# Patient Record
Sex: FEMALE | Race: WHITE | NOT HISPANIC OR LATINO
[De-identification: names, ages, dates, MRNs, and addresses within clinical notes are randomized per-mention and may not be internally consistent; named-entity substitution may affect disease eponyms.]

---

## 2018-05-04 ENCOUNTER — NON-APPOINTMENT (OUTPATIENT)
Age: 83
End: 2018-05-04

## 2018-05-04 ENCOUNTER — APPOINTMENT (OUTPATIENT)
Dept: CARDIOLOGY | Facility: CLINIC | Age: 83
End: 2018-05-04

## 2018-05-04 VITALS
HEIGHT: 61 IN | OXYGEN SATURATION: 99 % | RESPIRATION RATE: 11 BRPM | WEIGHT: 147 LBS | HEART RATE: 70 BPM | TEMPERATURE: 97.8 F | DIASTOLIC BLOOD PRESSURE: 85 MMHG | BODY MASS INDEX: 27.75 KG/M2 | SYSTOLIC BLOOD PRESSURE: 179 MMHG

## 2018-05-04 DIAGNOSIS — R94.6 ABNORMAL RESULTS OF THYROID FUNCTION STUDIES: ICD-10-CM

## 2018-05-04 DIAGNOSIS — Z86.19 PERSONAL HISTORY OF OTHER INFECTIOUS AND PARASITIC DISEASES: ICD-10-CM

## 2018-05-04 DIAGNOSIS — Z87.39 PERSONAL HISTORY OF OTHER DISEASES OF THE MUSCULOSKELETAL SYSTEM AND CONNECTIVE TISSUE: ICD-10-CM

## 2018-05-04 DIAGNOSIS — Z87.19 PERSONAL HISTORY OF OTHER DISEASES OF THE DIGESTIVE SYSTEM: ICD-10-CM

## 2018-05-04 DIAGNOSIS — Z87.440 PERSONAL HISTORY OF URINARY (TRACT) INFECTIONS: ICD-10-CM

## 2018-05-04 DIAGNOSIS — Z82.49 FAMILY HISTORY OF ISCHEMIC HEART DISEASE AND OTHER DISEASES OF THE CIRCULATORY SYSTEM: ICD-10-CM

## 2018-05-04 DIAGNOSIS — Z86.69 PERSONAL HISTORY OF OTHER DISEASES OF THE NERVOUS SYSTEM AND SENSE ORGANS: ICD-10-CM

## 2018-05-04 RX ORDER — MULTIVITAMIN
TABLET ORAL
Refills: 0 | Status: ACTIVE | COMMUNITY

## 2018-05-04 RX ORDER — MULTIVIT-MIN/FOLIC/VIT K/LYCOP 400-300MCG
25 MCG TABLET ORAL
Refills: 0 | Status: ACTIVE | COMMUNITY

## 2018-05-04 RX ORDER — ASPIRIN 81 MG
81 TABLET, DELAYED RELEASE (ENTERIC COATED) ORAL
Refills: 0 | Status: ACTIVE | COMMUNITY

## 2018-05-04 RX ORDER — CALCIUM CITRATE/VITAMIN D3 315MG-6.25
TABLET ORAL
Refills: 0 | Status: ACTIVE | COMMUNITY

## 2018-05-16 ENCOUNTER — MEDICATION RENEWAL (OUTPATIENT)
Age: 83
End: 2018-05-16

## 2018-05-24 ENCOUNTER — APPOINTMENT (OUTPATIENT)
Dept: CARDIOLOGY | Facility: CLINIC | Age: 83
End: 2018-05-24

## 2018-08-13 ENCOUNTER — NON-APPOINTMENT (OUTPATIENT)
Age: 83
End: 2018-08-13

## 2018-08-13 ENCOUNTER — APPOINTMENT (OUTPATIENT)
Dept: CARDIOLOGY | Facility: CLINIC | Age: 83
End: 2018-08-13

## 2018-08-13 VITALS
OXYGEN SATURATION: 98 % | HEIGHT: 61 IN | WEIGHT: 134 LBS | TEMPERATURE: 97.7 F | DIASTOLIC BLOOD PRESSURE: 93 MMHG | SYSTOLIC BLOOD PRESSURE: 199 MMHG | BODY MASS INDEX: 25.3 KG/M2 | HEART RATE: 90 BPM

## 2018-10-15 ENCOUNTER — RX RENEWAL (OUTPATIENT)
Age: 83
End: 2018-10-15

## 2018-11-29 ENCOUNTER — APPOINTMENT (OUTPATIENT)
Dept: CARDIOLOGY | Facility: CLINIC | Age: 83
End: 2018-11-29
Payer: MEDICARE

## 2018-11-29 ENCOUNTER — NON-APPOINTMENT (OUTPATIENT)
Age: 83
End: 2018-11-29

## 2018-11-29 ENCOUNTER — RECORD ABSTRACTING (OUTPATIENT)
Age: 83
End: 2018-11-29

## 2018-11-29 VITALS
WEIGHT: 131 LBS | DIASTOLIC BLOOD PRESSURE: 64 MMHG | HEART RATE: 84 BPM | OXYGEN SATURATION: 98 % | SYSTOLIC BLOOD PRESSURE: 142 MMHG | BODY MASS INDEX: 24.75 KG/M2

## 2018-11-29 DIAGNOSIS — Z78.9 OTHER SPECIFIED HEALTH STATUS: ICD-10-CM

## 2018-11-29 DIAGNOSIS — G96.19 OTHER DISORDERS OF MENINGES, NOT ELSEWHERE CLASSIFIED: ICD-10-CM

## 2018-11-29 PROCEDURE — 93000 ELECTROCARDIOGRAM COMPLETE: CPT

## 2018-11-29 PROCEDURE — 99214 OFFICE O/P EST MOD 30 MIN: CPT

## 2018-12-01 ENCOUNTER — RESULT REVIEW (OUTPATIENT)
Age: 83
End: 2018-12-01

## 2018-12-05 ENCOUNTER — APPOINTMENT (OUTPATIENT)
Dept: NEPHROLOGY | Facility: CLINIC | Age: 83
End: 2018-12-05
Payer: MEDICARE

## 2018-12-05 VITALS
SYSTOLIC BLOOD PRESSURE: 140 MMHG | BODY MASS INDEX: 23.55 KG/M2 | WEIGHT: 128 LBS | HEART RATE: 72 BPM | HEIGHT: 62 IN | DIASTOLIC BLOOD PRESSURE: 60 MMHG

## 2018-12-05 PROCEDURE — 99214 OFFICE O/P EST MOD 30 MIN: CPT

## 2018-12-10 ENCOUNTER — OTHER (OUTPATIENT)
Age: 83
End: 2018-12-10

## 2018-12-10 LAB
ANION GAP SERPL CALC-SCNC: 16 MMOL/L
APPEARANCE: ABNORMAL
BACTERIA UR CULT: ABNORMAL
BACTERIA: ABNORMAL
BILIRUBIN URINE: NEGATIVE
BLOOD URINE: NEGATIVE
BUN SERPL-MCNC: 18 MG/DL
CALCIUM SERPL-MCNC: 9.5 MG/DL
CHLORIDE SERPL-SCNC: 97 MMOL/L
CHOLEST SERPL-MCNC: 171 MG/DL
CHOLEST/HDLC SERPL: 2.4 RATIO
CO2 SERPL-SCNC: 25 MMOL/L
COLOR: ABNORMAL
CREAT SERPL-MCNC: 0.76 MG/DL
GLUCOSE QUALITATIVE U: NEGATIVE MG/DL
GLUCOSE SERPL-MCNC: 98 MG/DL
HDLC SERPL-MCNC: 70 MG/DL
HYALINE CASTS: 3 /LPF
KETONES URINE: NEGATIVE
LDLC SERPL CALC-MCNC: 74 MG/DL
LEUKOCYTE ESTERASE URINE: ABNORMAL
MICROSCOPIC-UA: NORMAL
NITRITE URINE: NEGATIVE
OSMOLALITY SERPL: 294 MOS/KG
PH URINE: 7.5
POTASSIUM SERPL-SCNC: 4.4 MMOL/L
PROTEIN URINE: NEGATIVE MG/DL
RED BLOOD CELLS URINE: 2 /HPF
SODIUM SERPL-SCNC: 138 MMOL/L
SPECIFIC GRAVITY URINE: 1.02
SQUAMOUS EPITHELIAL CELLS: 1 /HPF
TRIGL SERPL-MCNC: 136 MG/DL
UROBILINOGEN URINE: NEGATIVE MG/DL
WHITE BLOOD CELLS URINE: 195 /HPF

## 2018-12-11 ENCOUNTER — RX RENEWAL (OUTPATIENT)
Age: 83
End: 2018-12-11

## 2018-12-12 ENCOUNTER — RX RENEWAL (OUTPATIENT)
Age: 83
End: 2018-12-12

## 2018-12-13 ENCOUNTER — CHART COPY (OUTPATIENT)
Age: 83
End: 2018-12-13

## 2018-12-14 ENCOUNTER — RX RENEWAL (OUTPATIENT)
Age: 83
End: 2018-12-14

## 2018-12-31 ENCOUNTER — RX RENEWAL (OUTPATIENT)
Age: 83
End: 2018-12-31

## 2019-01-03 ENCOUNTER — APPOINTMENT (OUTPATIENT)
Dept: CARDIOLOGY | Facility: CLINIC | Age: 84
End: 2019-01-03
Payer: MEDICARE

## 2019-01-03 VITALS
WEIGHT: 131 LBS | DIASTOLIC BLOOD PRESSURE: 66 MMHG | OXYGEN SATURATION: 96 % | SYSTOLIC BLOOD PRESSURE: 144 MMHG | HEART RATE: 86 BPM | BODY MASS INDEX: 23.96 KG/M2

## 2019-01-03 PROCEDURE — 99214 OFFICE O/P EST MOD 30 MIN: CPT

## 2019-01-03 NOTE — REASON FOR VISIT
[Follow-Up - Clinic] : a clinic follow-up of [Coronary Artery Disease] : coronary artery disease [Hyperlipidemia] : hyperlipidemia [Hypertension] : hypertension [FreeTextEntry1] : ALVIN

## 2019-01-03 NOTE — PHYSICAL EXAM
[General Appearance - Well Developed] : well developed [General Appearance - Well Nourished] : well nourished [Normal Conjunctiva] : the conjunctiva exhibited no abnormalities [Auscultation Breath Sounds / Voice Sounds] : lungs were clear to auscultation bilaterally [Heart Rate And Rhythm] : heart rate and rhythm were normal [Heart Sounds] : normal S1 and S2 [Murmurs] : no murmurs present [Bowel Sounds] : normal bowel sounds [Abdomen Soft] : soft [Abdomen Tenderness] : non-tender [Abnormal Walk] : normal gait [Nail Clubbing] : no clubbing of the fingernails [Skin Color & Pigmentation] : normal skin color and pigmentation [Oriented To Time, Place, And Person] : oriented to person, place, and time [FreeTextEntry1] : Trace edema b/l, sl worse on the left (since the beginning, side of cyst/back pain)

## 2019-01-03 NOTE — DISCUSSION/SUMMARY
[FreeTextEntry1] : 1.  CAD, multivessel -> CABG\par Last cath in 6/16 showed patent KASSY -> LAD, occluded SVGs to D1, OM, RPDA -. 3 Xience stents to ostial , prox and mRCA\par Stable since.  No evidence of ACS.  gets atypical symptoms at times\par Rec:\par Same management\par \par 2.  HTN, labile and hard to control, especially in setting of various med intolerance\par Addressing her RADHA didn't help\par Admission to [facility] on 10/10/1 with HAs/N/V -> found with P 239/109 -.>eventually d/jennifer on [facility] 10 -. d/jennifer since due to ALVIN\par BPs tend to be higher here\par 8/18, patient started seeing Dr Zapata because of hyponatremia. He noticed that she had been hyponatremic on and off for years. He recommended avoiding ARC/ACE inhibitor as that will exacerbate her hyponatremia (due to vasoconstriction of the distal renal vessel). They admitted her overnight with Na 127 which went up to 129. He recommended 2 g sodium tablets and nifedipine 62 to replace the valsartan.\par Blood pressures overall have been acceptable\par At last visit, she reported swelling of her lower extremity which may be from a combination of factors. Nifedipine may play a part as she had swollen legs from Norvasc. She also had been liberal with her salt intake. Lastly, she has not been as active because of back pain from cyst.\par \par Echo in 5/18 showed nl EF\par We discussed the issues at length. I didn't recommend any diuretics, especially in light of her allergies/intolerance to various drugs. In the past she did not tolerate HCTZ or Lasix.\par I also didn't want to stop her nifedipine given good blood pressure response\par I recommended watching salt intake, keeping legs up when able and using compression stockings\par \par 3.  Edema\par See above discussion\par Likely from stasis. She did have some coarse breath sounds on exam and had been battling allergies. Echo in 5/18 showed normal EF\par I sent her for a CXR which was unremarkable\par She sounds clear today and her edema is better\par Rec:\par Follow\par Avoid salt\par Cont stockings\par \par 4.  Hyperlipidemia, intolerant of various statin\par Lipids on 10/1/17 showed chol 163, trigl 126, HDL 53, LDL 85 (on simvastatin 40) -> admission in 10/17 with HA/N/V -> thought the higher zocor at 40 may have played a part\par Used to tolerate zocor 20 -> back on it,  leg cramps\par Now on crestor 5\par Lipids from 12/5/18 shoed chol 171, trigl 136, HDL 70, LDL 74\par Rec:\par Same med\par Diet, exercise, wt loss

## 2019-01-03 NOTE — HISTORY OF PRESENT ILLNESS
[FreeTextEntry1] : Pt reports feeling well\par Occasional chest tightness at rest, in setting of stress\par ALVIN is better but persistent.  She's noticed it worse with certain things like wine\par \par She officially retired, at least for now

## 2019-01-03 NOTE — HISTORY OF PRESENT ILLNESS
[FreeTextEntry1] : Pt has been doing ok, denying exertional CP, SOB\par She gets chest tightness at rest sometimes, in setting of stress\par \par Her ALVIN is better, though persistent\par \par She officially retired , at least for now

## 2019-01-03 NOTE — PHYSICAL EXAM
[General Appearance - Well Developed] : well developed [General Appearance - Well Nourished] : well nourished [Normal Conjunctiva] : the conjunctiva exhibited no abnormalities [Auscultation Breath Sounds / Voice Sounds] : lungs were clear to auscultation bilaterally [Heart Rate And Rhythm] : heart rate and rhythm were normal [Heart Sounds] : normal S1 and S2 [Murmurs] : no murmurs present [FreeTextEntry1] : Trace edema b/l, sl worse on the left (since the beginning, side of cyst/back pain) [Bowel Sounds] : normal bowel sounds [Abdomen Soft] : soft [Abdomen Tenderness] : non-tender [Abnormal Walk] : normal gait [Nail Clubbing] : no clubbing of the fingernails [Skin Color & Pigmentation] : normal skin color and pigmentation [Oriented To Time, Place, And Person] : oriented to person, place, and time

## 2019-01-24 ENCOUNTER — APPOINTMENT (OUTPATIENT)
Dept: ENDOCRINOLOGY | Facility: CLINIC | Age: 84
End: 2019-01-24
Payer: MEDICARE

## 2019-01-24 VITALS
DIASTOLIC BLOOD PRESSURE: 78 MMHG | HEART RATE: 76 BPM | HEIGHT: 62 IN | WEIGHT: 130 LBS | BODY MASS INDEX: 23.92 KG/M2 | SYSTOLIC BLOOD PRESSURE: 130 MMHG

## 2019-01-24 PROCEDURE — 99213 OFFICE O/P EST LOW 20 MIN: CPT | Mod: 25

## 2019-01-24 PROCEDURE — 36415 COLL VENOUS BLD VENIPUNCTURE: CPT

## 2019-01-24 RX ORDER — NIFEDIPINE 60 MG
60 TABLET, EXTENDED RELEASE ORAL
Refills: 0 | Status: DISCONTINUED | COMMUNITY
End: 2019-01-24

## 2019-01-24 RX ORDER — NIFEDIPINE 60 MG/1
60 TABLET, EXTENDED RELEASE ORAL
Qty: 90 | Refills: 0 | Status: DISCONTINUED | COMMUNITY
Start: 2018-12-11 | End: 2019-01-24

## 2019-01-28 ENCOUNTER — RX RENEWAL (OUTPATIENT)
Age: 84
End: 2019-01-28

## 2019-01-31 ENCOUNTER — RESULT REVIEW (OUTPATIENT)
Age: 84
End: 2019-01-31

## 2019-02-10 LAB
25(OH)D3 SERPL-MCNC: 33.1 NG/ML
ANION GAP SERPL CALC-SCNC: 13 MMOL/L
BUN SERPL-MCNC: 16 MG/DL
CALCIUM SERPL-MCNC: 9.9 MG/DL
CHLORIDE SERPL-SCNC: 104 MMOL/L
CO2 SERPL-SCNC: 26 MMOL/L
COLLAGEN CTX SERPL-MCNC: 198 PG/ML
CREAT SERPL-MCNC: 0.81 MG/DL
GLUCOSE SERPL-MCNC: 96 MG/DL
OSTEOCALCIN SERPL-MCNC: 15 NG/ML
POTASSIUM SERPL-SCNC: 4 MMOL/L
SODIUM SERPL-SCNC: 143 MMOL/L
TSH SERPL-ACNC: 4.91 UIU/ML

## 2019-02-13 ENCOUNTER — RESULT REVIEW (OUTPATIENT)
Age: 84
End: 2019-02-13

## 2019-02-24 NOTE — ASSESSMENT
[FreeTextEntry1] : &\par Doing well from endocrine perspective.\par Labs today.\par To bone density and Prolia.
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
## 2019-03-11 ENCOUNTER — APPOINTMENT (OUTPATIENT)
Dept: NEPHROLOGY | Facility: CLINIC | Age: 84
End: 2019-03-11
Payer: MEDICARE

## 2019-03-11 VITALS
SYSTOLIC BLOOD PRESSURE: 142 MMHG | BODY MASS INDEX: 24.11 KG/M2 | HEART RATE: 76 BPM | DIASTOLIC BLOOD PRESSURE: 66 MMHG | HEIGHT: 62 IN | WEIGHT: 131 LBS

## 2019-03-11 PROCEDURE — 36415 COLL VENOUS BLD VENIPUNCTURE: CPT

## 2019-03-11 PROCEDURE — 99214 OFFICE O/P EST MOD 30 MIN: CPT | Mod: 25

## 2019-03-11 RX ORDER — DEXLANSOPRAZOLE 60 MG/1
60 CAPSULE, DELAYED RELEASE ORAL
Refills: 0 | Status: DISCONTINUED | COMMUNITY
End: 2019-03-11

## 2019-03-11 RX ORDER — PANTOPRAZOLE 40 MG/1
40 TABLET, DELAYED RELEASE ORAL
Refills: 0 | Status: DISCONTINUED | COMMUNITY
End: 2019-03-11

## 2019-03-12 LAB
APPEARANCE: CLEAR
BILIRUBIN URINE: NEGATIVE
BLOOD URINE: NEGATIVE
COLOR: COLORLESS
GLUCOSE QUALITATIVE U: NEGATIVE
KETONES URINE: NEGATIVE
LEUKOCYTE ESTERASE URINE: NEGATIVE
NITRITE URINE: NEGATIVE
OSMOLALITY SERPL: 292 MOS/KG
PH URINE: 6.5
PROTEIN URINE: NEGATIVE
SPECIFIC GRAVITY URINE: 1.01
UROBILINOGEN URINE: NORMAL

## 2019-03-12 NOTE — HISTORY OF PRESENT ILLNESS
[FreeTextEntry1] : 84 yo female initially referred by Dr. Ayala for hyponatremia and uncontrolled HTN - SNa and BP improved on current meds- - nausea improved as is back pain - BP still well controlled, but ran out of guanfacin aburptly and BP shot up to 180's, improved after resuming it, but did not check it for several days after running out -  BP remains "best in 20 years" - is still c/o some le edema more so L leg ( side she had scitica and weakness along with muscle atrophy),exacerbated by combination of guanfacin, nifedipine/CCB however is tolerating it and using compression stockings - \par        From Prior visit; SNa also improved 143 (1/2019), has been fluctuating between 126 and 138 since 2005 ( 126 in 2005, 138 4/2018, 127 8/2018), serum sodium very low during hospitalization in 10/2017 at 258, urine osm also low at that time 350 as was urine sodium 27 suggesting lack of solute intake with ongoing hypotonic fluid intake - has hx of poorly controlled htn and is taking guanfacin ( alpha 2 adrenergic), valsartan, metoprolol succer 100mg and metoprolol 25mg - is very nauseated, not dizzy or falling - reports hx of severe uncontrolled HTN and renal angioplasty in 4/2013 -.

## 2019-03-12 NOTE — PHYSICAL EXAM
[General Appearance - Alert] : alert [General Appearance - In No Acute Distress] : in no acute distress [Sclera] : the sclera and conjunctiva were normal [PERRL With Normal Accommodation] : pupils were equal in size, round, and reactive to light [Outer Ear] : the ears and nose were normal in appearance [Neck Appearance] : the appearance of the neck was normal [] : no respiratory distress [Respiration, Rhythm And Depth] : normal respiratory rhythm and effort [Apical Impulse] : the apical impulse was normal [Heart Rate And Rhythm] : heart rate was normal and rhythm regular [Bowel Sounds] : normal bowel sounds [Abdomen Soft] : soft [Abnormal Walk] : normal gait [Musculoskeletal - Swelling] : no joint swelling seen [Oriented To Time, Place, And Person] : oriented to person, place, and time [Impaired Insight] : insight and judgment were intact [FreeTextEntry1] : +ve biol le edema, L>R

## 2019-03-12 NOTE — ASSESSMENT
[FreeTextEntry1] : Pleasant 82 yo female with hx of poorly controlled HTN, hyponatremia, hx of back pain sec to scitica - BP markedly improved  on nifedipine, guafacin and metoprolol, sodium improved (143) will recheck today, back pain improved  - off pain meds besides tylenol - mild le edema sec to CCB and guanfacin, L>R sec to ? atrophy from sciatica/disuse - using support stockings; try reducing dose of metoprolol from 125mg to 100mg and will monitor to see if guanfacin can be reduced as well - one thing at a time

## 2019-03-13 LAB
ANION GAP SERPL CALC-SCNC: 15 MMOL/L
BUN SERPL-MCNC: 17 MG/DL
CALCIUM SERPL-MCNC: 9.1 MG/DL
CHLORIDE SERPL-SCNC: 101 MMOL/L
CO2 SERPL-SCNC: 24 MMOL/L
CREAT SERPL-MCNC: 0.75 MG/DL
GLUCOSE SERPL-MCNC: 72 MG/DL
POTASSIUM SERPL-SCNC: 4.2 MMOL/L
SODIUM SERPL-SCNC: 140 MMOL/L

## 2019-03-18 ENCOUNTER — MEDICATION RENEWAL (OUTPATIENT)
Age: 84
End: 2019-03-18

## 2019-03-18 ENCOUNTER — RX RENEWAL (OUTPATIENT)
Age: 84
End: 2019-03-18

## 2019-04-02 ENCOUNTER — RX RENEWAL (OUTPATIENT)
Age: 84
End: 2019-04-02

## 2019-04-03 ENCOUNTER — RX RENEWAL (OUTPATIENT)
Age: 84
End: 2019-04-03

## 2019-04-04 ENCOUNTER — RX RENEWAL (OUTPATIENT)
Age: 84
End: 2019-04-04

## 2019-04-04 RX ORDER — GUANFACINE 1 MG/1
1 TABLET ORAL TWICE DAILY
Qty: 180 | Refills: 3 | Status: COMPLETED | COMMUNITY
Start: 2018-01-29 | End: 2019-04-04

## 2019-04-11 ENCOUNTER — APPOINTMENT (OUTPATIENT)
Dept: NEPHROLOGY | Facility: CLINIC | Age: 84
End: 2019-04-11
Payer: MEDICARE

## 2019-04-11 VITALS
HEART RATE: 76 BPM | WEIGHT: 126 LBS | BODY MASS INDEX: 23.19 KG/M2 | SYSTOLIC BLOOD PRESSURE: 150 MMHG | HEIGHT: 62 IN | DIASTOLIC BLOOD PRESSURE: 82 MMHG

## 2019-04-11 PROCEDURE — 99214 OFFICE O/P EST MOD 30 MIN: CPT

## 2019-04-11 NOTE — ASSESSMENT
[FreeTextEntry1] : Pleasant 82 yo female with hx of previously poorly controlled HTN and chronic hyponatremia -both SNa and BP markedly improved  on nifedipine and metoprolol, guanfacin. Her sodium is also improved (140's). Her back pain is also improved  - off pain meds besides tylenol - mild le edema sec to CCB, vein harvesting, debilitation/immobility, stasos - using support stockings; bp wnl tolerating reduced dose of metoprolol from 125mg to 100mg and bp stable at this time

## 2019-04-11 NOTE — PHYSICAL EXAM
[General Appearance - Alert] : alert [PERRL With Normal Accommodation] : pupils were equal in size, round, and reactive to light [Sclera] : the sclera and conjunctiva were normal [General Appearance - In No Acute Distress] : in no acute distress [Outer Ear] : the ears and nose were normal in appearance [Neck Appearance] : the appearance of the neck was normal [Respiration, Rhythm And Depth] : normal respiratory rhythm and effort [Heart Rate And Rhythm] : heart rate was normal and rhythm regular [Apical Impulse] : the apical impulse was normal [Abdomen Soft] : soft [Bowel Sounds] : normal bowel sounds [Abnormal Walk] : normal gait [Skin Color & Pigmentation] : normal skin color and pigmentation [Musculoskeletal - Swelling] : no joint swelling seen [] : no rash [Oriented To Time, Place, And Person] : oriented to person, place, and time [Impaired Insight] : insight and judgment were intact [FreeTextEntry1] : +ve biol le edema, L>R

## 2019-04-11 NOTE — HISTORY OF PRESENT ILLNESS
[FreeTextEntry1] : 85 yo female initially referred by Dr. 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 - BP remains "best in 20 years" - is still c/o some le edema more so L leg ( side she had sciatica and weakness along with muscle atrophy, not to mention vein harvesting when shehad CABG),exacerbated by combination of guanfacin, nifedipine/CCB however is tolerating it and using compression stockings - \par        SNa also improved 140's last few months  (1-4/2019), has been fluctuating between 126 and 138 since 2005 ( 126 in 2005, 138 4/2018, 127 8/2018), serum sodium very low during hospitalization in 10/2017 at 258, urine osm also low at that time 350 as was urine sodium 27 suggesting lack of solute intake with ongoing hypotonic fluid intake - has hx of poorly controlled htn and is taking guanfacin ( alpha 2 adrenergic), valsartan, metoprolol succer 100mg and metoprolol 25mg - is very nauseated, not dizzy or falling - reports hx of severe uncontrolled HTN and renal angioplasty in 4/2013 -.

## 2019-05-07 ENCOUNTER — RX RENEWAL (OUTPATIENT)
Age: 84
End: 2019-05-07

## 2019-05-21 ENCOUNTER — APPOINTMENT (OUTPATIENT)
Dept: OBGYN | Facility: CLINIC | Age: 84
End: 2019-05-21
Payer: MEDICARE

## 2019-05-21 ENCOUNTER — RX RENEWAL (OUTPATIENT)
Age: 84
End: 2019-05-21

## 2019-05-21 VITALS
DIASTOLIC BLOOD PRESSURE: 80 MMHG | WEIGHT: 135 LBS | HEIGHT: 62 IN | SYSTOLIC BLOOD PRESSURE: 130 MMHG | BODY MASS INDEX: 24.84 KG/M2

## 2019-05-21 DIAGNOSIS — Z80.9 FAMILY HISTORY OF MALIGNANT NEOPLASM, UNSPECIFIED: ICD-10-CM

## 2019-05-21 PROCEDURE — G0101: CPT

## 2019-05-21 NOTE — PHYSICAL EXAM
[Awake] : awake [Alert] : alert [Soft] : soft [Oriented x3] : oriented to person, place, and time [Vulvar Atrophy] : vulvar atrophy [Normal] : uterus [Atrophy] : atrophy [No Bleeding] : there was no active vaginal bleeding [Uterine Adnexae] : were not tender and not enlarged [Nl Sphincter Tone] : normal sphincter tone [Acute Distress] : no acute distress [Thyroid Nodule] : no thyroid nodule [Mass] : no breast mass [Nipple Discharge] : no nipple discharge [Axillary LAD] : no axillary lymphadenopathy [Tender] : non tender [FreeTextEntry4] : 2 FB introitus [FreeTextEntry9] : no masses

## 2019-05-22 ENCOUNTER — RX RENEWAL (OUTPATIENT)
Age: 84
End: 2019-05-22

## 2019-06-17 ENCOUNTER — MEDICATION RENEWAL (OUTPATIENT)
Age: 84
End: 2019-06-17

## 2019-07-05 ENCOUNTER — APPOINTMENT (OUTPATIENT)
Dept: CARDIOLOGY | Facility: CLINIC | Age: 84
End: 2019-07-05

## 2019-07-08 ENCOUNTER — NON-APPOINTMENT (OUTPATIENT)
Age: 84
End: 2019-07-08

## 2019-07-08 ENCOUNTER — APPOINTMENT (OUTPATIENT)
Dept: CARDIOLOGY | Facility: CLINIC | Age: 84
End: 2019-07-08
Payer: MEDICARE

## 2019-07-08 VITALS
SYSTOLIC BLOOD PRESSURE: 146 MMHG | HEIGHT: 62 IN | WEIGHT: 127 LBS | HEART RATE: 90 BPM | BODY MASS INDEX: 23.37 KG/M2 | DIASTOLIC BLOOD PRESSURE: 63 MMHG | OXYGEN SATURATION: 97 %

## 2019-07-08 PROCEDURE — 93000 ELECTROCARDIOGRAM COMPLETE: CPT

## 2019-07-08 PROCEDURE — 99214 OFFICE O/P EST MOD 30 MIN: CPT

## 2019-07-08 NOTE — REASON FOR VISIT
[Follow-Up - Clinic] : a clinic follow-up of [Coronary Artery Disease] : coronary artery disease [Hypertension] : hypertension [Hyperlipidemia] : hyperlipidemia [FreeTextEntry1] : edema

## 2019-07-08 NOTE — HISTORY OF PRESENT ILLNESS
[FreeTextEntry1] : Doing well\par \par Right chest pressure, at rest, sometimes, with tenderness\par Never with activity and exercise\par \par Still with lower extremity edema, responsive to compression stockings\par She also says she knows her triggers which are salt, carbonation, any kind of alcohol.

## 2019-07-08 NOTE — PHYSICAL EXAM
[General Appearance - Well Developed] : well developed [General Appearance - Well Nourished] : well nourished [Normal Conjunctiva] : the conjunctiva exhibited no abnormalities [Heart Rate And Rhythm] : heart rate and rhythm were normal [Auscultation Breath Sounds / Voice Sounds] : lungs were clear to auscultation bilaterally [Murmurs] : no murmurs present [Heart Sounds] : normal S1 and S2 [Abdomen Soft] : soft [Bowel Sounds] : normal bowel sounds [Abnormal Walk] : normal gait [Abdomen Tenderness] : non-tender [Nail Clubbing] : no clubbing of the fingernails [Oriented To Time, Place, And Person] : oriented to person, place, and time [Skin Color & Pigmentation] : normal skin color and pigmentation [FreeTextEntry1] : Trace edema b/l, sl worse on the left (since the beginning, side of cyst/back pain)

## 2019-07-24 ENCOUNTER — APPOINTMENT (OUTPATIENT)
Dept: ENDOCRINOLOGY | Facility: CLINIC | Age: 84
End: 2019-07-24
Payer: MEDICARE

## 2019-07-24 VITALS
DIASTOLIC BLOOD PRESSURE: 80 MMHG | HEIGHT: 62 IN | BODY MASS INDEX: 23.92 KG/M2 | HEART RATE: 65 BPM | SYSTOLIC BLOOD PRESSURE: 130 MMHG | WEIGHT: 130 LBS

## 2019-07-24 PROCEDURE — 99214 OFFICE O/P EST MOD 30 MIN: CPT | Mod: 25

## 2019-07-24 PROCEDURE — 36415 COLL VENOUS BLD VENIPUNCTURE: CPT

## 2019-07-24 RX ORDER — SALIVA SUBSTITUTE COMB NO.10
POWDER IN PACKET (EA) MUCOUS MEMBRANE
Qty: 90 | Refills: 0 | Status: DISCONTINUED | COMMUNITY
Start: 2018-02-13 | End: 2019-07-24

## 2019-07-24 RX ORDER — OMEGA-3-ACID ETHYL ESTERS CAPSULES 1 G/1
1 CAPSULE, LIQUID FILLED ORAL
Refills: 0 | Status: DISCONTINUED | COMMUNITY
End: 2019-07-24

## 2019-07-24 RX ORDER — GABAPENTIN 100 MG/1
100 CAPSULE ORAL
Qty: 180 | Refills: 0 | Status: DISCONTINUED | COMMUNITY
Start: 2018-10-03 | End: 2019-07-24

## 2019-07-24 RX ORDER — LEVOTHYROXINE SODIUM 0.17 MG/1
TABLET ORAL
Refills: 0 | Status: DISCONTINUED | COMMUNITY
End: 2019-07-24

## 2019-07-24 NOTE — PHYSICAL EXAM
[Alert] : alert [No Acute Distress] : no acute distress [Well Nourished] : well nourished [Well Developed] : well developed [Normal Sclera/Conjunctiva] : normal sclera/conjunctiva [EOMI] : extra ocular movement intact [No Proptosis] : no proptosis [Normal Oropharynx] : the oropharynx was normal [No Thyroid Nodules] : there were no palpable thyroid nodules [Thyroid Not Enlarged] : the thyroid was not enlarged [No Respiratory Distress] : no respiratory distress [No Accessory Muscle Use] : no accessory muscle use [Normal Rate] : heart rate was normal  [Clear to Auscultation] : lungs were clear to auscultation bilaterally [Normal S1, S2] : normal S1 and S2 [Regular Rhythm] : with a regular rhythm [Pedal Pulses Normal] : the pedal pulses are present [No Edema] : there was no peripheral edema [Normal Bowel Sounds] : normal bowel sounds [Not Tender] : non-tender [Soft] : abdomen soft [Not Distended] : not distended [Anterior Cervical Nodes] : anterior cervical nodes [Post Cervical Nodes] : posterior cervical nodes [Axillary Nodes] : axillary nodes [Normal] : normal and non tender [No Spinal Tenderness] : no spinal tenderness [Spine Straight] : spine straight [No Stigmata of Cushings Syndrome] : no stigmata of cushings syndrome [Normal Gait] : normal gait [Normal Strength/Tone] : muscle strength and tone were normal [No Rash] : no rash [Acanthosis Nigricans] : no acanthosis nigricans [Normal Reflexes] : deep tendon reflexes were 2+ and symmetric [No Tremors] : no tremors [Oriented x3] : oriented to person, place, and time
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
## 2019-07-24 NOTE — ASSESSMENT
[FreeTextEntry1] : &\par She stopped levothyroixne 25 mcg QOD in January.\par TFTs today will be of interest.\par \par 2016 femoral neck T -2.9  and now -2.7.\par Prolia appears to be offering benefit.\par Labs today and next Prolia on or after August 25 and ROV here in January 2020.

## 2019-08-01 ENCOUNTER — RX RENEWAL (OUTPATIENT)
Age: 84
End: 2019-08-01

## 2019-08-01 LAB
25(OH)D3 SERPL-MCNC: 37.8 NG/ML
ALBUMIN SERPL ELPH-MCNC: 4.7 G/DL
ALP BLD-CCNC: 53 U/L
ALT SERPL-CCNC: 9 U/L
ANION GAP SERPL CALC-SCNC: 15 MMOL/L
AST SERPL-CCNC: 25 U/L
BASOPHILS # BLD AUTO: 0.04 K/UL
BASOPHILS NFR BLD AUTO: 0.7 %
BILIRUB DIRECT SERPL-MCNC: 0.1 MG/DL
BILIRUB INDIRECT SERPL-MCNC: 0.4 MG/DL
BILIRUB SERPL-MCNC: 0.5 MG/DL
BUN SERPL-MCNC: 17 MG/DL
CALCIUM SERPL-MCNC: 10.1 MG/DL
CHLORIDE SERPL-SCNC: 100 MMOL/L
CO2 SERPL-SCNC: 27 MMOL/L
COLLAGEN CTX SERPL-MCNC: 102 PG/ML
CREAT SERPL-MCNC: 0.75 MG/DL
EOSINOPHIL # BLD AUTO: 0.44 K/UL
EOSINOPHIL NFR BLD AUTO: 7.3 %
GLUCOSE SERPL-MCNC: 103 MG/DL
HCT VFR BLD CALC: 39.2 %
HGB BLD-MCNC: 12.4 G/DL
IMM GRANULOCYTES NFR BLD AUTO: 0.2 %
LYMPHOCYTES # BLD AUTO: 1.89 K/UL
LYMPHOCYTES NFR BLD AUTO: 31.6 %
MAN DIFF?: NORMAL
MCHC RBC-ENTMCNC: 30.8 PG
MCHC RBC-ENTMCNC: 31.6 GM/DL
MCV RBC AUTO: 97.5 FL
MONOCYTES # BLD AUTO: 0.53 K/UL
MONOCYTES NFR BLD AUTO: 8.8 %
NEUTROPHILS # BLD AUTO: 3.08 K/UL
NEUTROPHILS NFR BLD AUTO: 51.4 %
OSTEOCALCIN SERPL-MCNC: 16 NG/ML
PLATELET # BLD AUTO: 294 K/UL
POTASSIUM SERPL-SCNC: 4.4 MMOL/L
PROT SERPL-MCNC: 7.5 G/DL
RBC # BLD: 4.02 M/UL
RBC # FLD: 14.6 %
SODIUM SERPL-SCNC: 142 MMOL/L
THYROGLOB AB SERPL-ACNC: <20 IU/ML
THYROPEROXIDASE AB SERPL IA-ACNC: <10 IU/ML
TSH SERPL-ACNC: 7.62 UIU/ML
WBC # FLD AUTO: 5.99 K/UL

## 2019-09-12 ENCOUNTER — RX RENEWAL (OUTPATIENT)
Age: 84
End: 2019-09-12

## 2019-09-27 ENCOUNTER — RESULT REVIEW (OUTPATIENT)
Age: 84
End: 2019-09-27

## 2019-09-27 ENCOUNTER — APPOINTMENT (OUTPATIENT)
Dept: NEPHROLOGY | Facility: CLINIC | Age: 84
End: 2019-09-27
Payer: MEDICARE

## 2019-09-27 VITALS
HEART RATE: 68 BPM | SYSTOLIC BLOOD PRESSURE: 134 MMHG | DIASTOLIC BLOOD PRESSURE: 62 MMHG | WEIGHT: 124 LBS | HEIGHT: 62 IN | BODY MASS INDEX: 22.82 KG/M2

## 2019-09-27 PROCEDURE — 99214 OFFICE O/P EST MOD 30 MIN: CPT | Mod: 25

## 2019-09-27 PROCEDURE — 36415 COLL VENOUS BLD VENIPUNCTURE: CPT

## 2019-09-27 NOTE — ASSESSMENT
[FreeTextEntry1] : Pleasant 83 yo female with hx of previously poorly controlled HTN and chronic hyponatremia - both SNa and BP markedly improved  on nifedipine and metoprolol, guanfacin. Her sodium is also improved (140's). Her back pain is also improved  - off pain meds besides tylenol - mild le edema sec to CCB, vein harvesting, debilitation/immobility, stasis - using support stockings; bp wnl tolerating reduced dose of metoprolol from 125mg to 100mg and bp stable at this time - will try reducing metoprolol further to 50mg /day.

## 2019-09-27 NOTE — HISTORY OF PRESENT ILLNESS
[FreeTextEntry1] : 85 yo female initially referred by Dr. 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 - BP remains "best in 20 years" - is still c/o some le edema more so L leg ( side she had sciatica and weakness along with muscle atrophy, not to mention vein harvesting when shehad CABG),exacerbated by combination of guanfacin, nifedipine/CCB however is tolerating it and using compression stockings - \par        SNa also improved 140's last few months, had been fluctuating between 126 and 138 since 2005 (126 in 2005, 138 4/2018, 127 8/2018), serum sodium very low during hospitalization in 10/2017 at 258, urine osm also low at that time 350 as was urine sodium 27 suggesting lack of solute intake with ongoing hypotonic fluid intake - has hx of poorly controlled htn and is taking guanfacin ( alpha 2 adrenergic), valsartan, metoprolol succer 100mg and metoprolol 25mg - is very nauseated, not dizzy or falling - reports hx of severe uncontrolled HTN and renal angioplasty in 4/2013 -.

## 2019-09-27 NOTE — PHYSICAL EXAM
[General Appearance - Alert] : alert [General Appearance - In No Acute Distress] : in no acute distress [Sclera] : the sclera and conjunctiva were normal [PERRL With Normal Accommodation] : pupils were equal in size, round, and reactive to light [Outer Ear] : the ears and nose were normal in appearance [Neck Appearance] : the appearance of the neck was normal [Respiration, Rhythm And Depth] : normal respiratory rhythm and effort [Apical Impulse] : the apical impulse was normal [Heart Rate And Rhythm] : heart rate was normal and rhythm regular [Bowel Sounds] : normal bowel sounds [Abdomen Soft] : soft [Abnormal Walk] : normal gait [Musculoskeletal - Swelling] : no joint swelling seen [Skin Color & Pigmentation] : normal skin color and pigmentation [] : no rash [Oriented To Time, Place, And Person] : oriented to person, place, and time [Impaired Insight] : insight and judgment were intact [FreeTextEntry1] : +ve biol le edema, L>R

## 2019-12-06 ENCOUNTER — RX RENEWAL (OUTPATIENT)
Age: 84
End: 2019-12-06

## 2020-01-06 ENCOUNTER — APPOINTMENT (OUTPATIENT)
Dept: CARDIOLOGY | Facility: CLINIC | Age: 85
End: 2020-01-06
Payer: MEDICARE

## 2020-01-06 ENCOUNTER — NON-APPOINTMENT (OUTPATIENT)
Age: 85
End: 2020-01-06

## 2020-01-06 VITALS
SYSTOLIC BLOOD PRESSURE: 125 MMHG | HEART RATE: 65 BPM | DIASTOLIC BLOOD PRESSURE: 52 MMHG | WEIGHT: 126 LBS | BODY MASS INDEX: 23.05 KG/M2 | OXYGEN SATURATION: 98 %

## 2020-01-06 PROCEDURE — 93000 ELECTROCARDIOGRAM COMPLETE: CPT

## 2020-01-06 PROCEDURE — 99214 OFFICE O/P EST MOD 30 MIN: CPT

## 2020-01-06 NOTE — PHYSICAL EXAM
[General Appearance - Well Developed] : well developed [General Appearance - Well Nourished] : well nourished [Normal Conjunctiva] : the conjunctiva exhibited no abnormalities [Auscultation Breath Sounds / Voice Sounds] : lungs were clear to auscultation bilaterally [Heart Rate And Rhythm] : heart rate and rhythm were normal [Murmurs] : no murmurs present [Heart Sounds] : normal S1 and S2 [Bowel Sounds] : normal bowel sounds [Abdomen Soft] : soft [Abnormal Walk] : normal gait [Abdomen Tenderness] : non-tender [Nail Clubbing] : no clubbing of the fingernails [Skin Color & Pigmentation] : normal skin color and pigmentation [Oriented To Time, Place, And Person] : oriented to person, place, and time [FreeTextEntry1] : No JVD or bruits

## 2020-01-06 NOTE — HISTORY OF PRESENT ILLNESS
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  No CP when going up and down steep driveway .  Doing lots around the house as  has been diagnosed with PD

## 2020-01-15 ENCOUNTER — APPOINTMENT (OUTPATIENT)
Dept: ENDOCRINOLOGY | Facility: CLINIC | Age: 85
End: 2020-01-15
Payer: MEDICARE

## 2020-01-15 VITALS
SYSTOLIC BLOOD PRESSURE: 120 MMHG | HEART RATE: 80 BPM | BODY MASS INDEX: 23 KG/M2 | WEIGHT: 125 LBS | DIASTOLIC BLOOD PRESSURE: 60 MMHG | HEIGHT: 62 IN

## 2020-01-15 DIAGNOSIS — D64.9 ANEMIA, UNSPECIFIED: ICD-10-CM

## 2020-01-15 PROCEDURE — 99214 OFFICE O/P EST MOD 30 MIN: CPT | Mod: 25

## 2020-01-15 PROCEDURE — 36415 COLL VENOUS BLD VENIPUNCTURE: CPT

## 2020-01-15 NOTE — ASSESSMENT
[FreeTextEntry1] : &\par Osteoporosis under treatment with Prolia\par Normocalcemi hyperpara being monitored.\par
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
## 2020-01-15 NOTE — PHYSICAL EXAM
[Alert] : alert [No Acute Distress] : no acute distress [Well Nourished] : well nourished [Healthy Appearance] : healthy appearance [Well Developed] : well developed [Normal Voice/Communication] : normal voice communication [EOMI] : extra ocular movement intact [No Proptosis] : no proptosis [Normal Oropharynx] : the oropharynx was normal [Thyroid Not Enlarged] : the thyroid was not enlarged [No Thyroid Nodules] : there were no palpable thyroid nodules [No Respiratory Distress] : no respiratory distress [Normal Rate and Effort] : normal respiratory rhythm and effort [No Accessory Muscle Use] : no accessory muscle use [Normal Rate] : heart rate was normal  [Regular Rhythm] : with a regular rhythm [Not Distended] : not distended [No Spinal Tenderness] : no spinal tenderness [No Stigmata of Cushings Syndrome] : no stigmata of cushings syndrome [Normal Strength/Tone] : muscle strength and tone were normal [No Rash] : no rash [No Involuntary Movements] : no involuntary movements were seen [No Tremors] : no tremors [Oriented x3] : oriented to person, place, and time [Normal Insight/Judgement] : insight and judgment were intact [Normal Mood] : the mood was normal [Normal Affect] : the affect was normal [Acanthosis Nigricans] : no acanthosis nigricans [de-identified] : trace edema

## 2020-01-16 LAB
25(OH)D3 SERPL-MCNC: 37.5 NG/ML
ALBUMIN SERPL ELPH-MCNC: 5 G/DL
ALP BLD-CCNC: 60 U/L
ALT SERPL-CCNC: 8 U/L
ANION GAP SERPL CALC-SCNC: 15 MMOL/L
AST SERPL-CCNC: 26 U/L
BASOPHILS # BLD AUTO: 0.04 K/UL
BASOPHILS NFR BLD AUTO: 0.5 %
BILIRUB DIRECT SERPL-MCNC: 0.1 MG/DL
BILIRUB INDIRECT SERPL-MCNC: 0.4 MG/DL
BILIRUB SERPL-MCNC: 0.6 MG/DL
BUN SERPL-MCNC: 19 MG/DL
CALCIUM SERPL-MCNC: 9.6 MG/DL
CHLORIDE SERPL-SCNC: 101 MMOL/L
CO2 SERPL-SCNC: 25 MMOL/L
CORTIS SERPL-MCNC: 5.7 UG/DL
CREAT SERPL-MCNC: 0.78 MG/DL
EOSINOPHIL # BLD AUTO: 0.24 K/UL
EOSINOPHIL NFR BLD AUTO: 3.2 %
GLUCOSE SERPL-MCNC: 124 MG/DL
HCT VFR BLD CALC: 42 %
HGB BLD-MCNC: 13.3 G/DL
IMM GRANULOCYTES NFR BLD AUTO: 0.1 %
LYMPHOCYTES # BLD AUTO: 1.78 K/UL
LYMPHOCYTES NFR BLD AUTO: 23.5 %
MAN DIFF?: NORMAL
MCHC RBC-ENTMCNC: 30.9 PG
MCHC RBC-ENTMCNC: 31.7 GM/DL
MCV RBC AUTO: 97.4 FL
MONOCYTES # BLD AUTO: 0.54 K/UL
MONOCYTES NFR BLD AUTO: 7.1 %
NEUTROPHILS # BLD AUTO: 4.98 K/UL
NEUTROPHILS NFR BLD AUTO: 65.6 %
PLATELET # BLD AUTO: 292 K/UL
POTASSIUM SERPL-SCNC: 4.4 MMOL/L
PROT SERPL-MCNC: 7.8 G/DL
RBC # BLD: 4.31 M/UL
RBC # FLD: 13.7 %
SODIUM SERPL-SCNC: 142 MMOL/L
T4 SERPL-MCNC: 6.6 UG/DL
TSH SERPL-ACNC: 4.86 UIU/ML
WBC # FLD AUTO: 7.59 K/UL

## 2020-01-18 LAB
CALCIUM SERPL-MCNC: 9.6 MG/DL
OSTEOCALCIN SERPL-MCNC: 13 NG/ML
PARATHYROID HORMONE INTACT: 57 PG/ML
THYROGLOB AB SERPL-ACNC: <20 IU/ML
THYROPEROXIDASE AB SERPL IA-ACNC: <10 IU/ML

## 2020-01-22 ENCOUNTER — APPOINTMENT (OUTPATIENT)
Dept: NEPHROLOGY | Facility: CLINIC | Age: 85
End: 2020-01-22
Payer: MEDICARE

## 2020-01-22 VITALS
HEART RATE: 80 BPM | WEIGHT: 124 LBS | SYSTOLIC BLOOD PRESSURE: 130 MMHG | HEIGHT: 62 IN | DIASTOLIC BLOOD PRESSURE: 62 MMHG | BODY MASS INDEX: 22.82 KG/M2

## 2020-01-22 PROCEDURE — 99214 OFFICE O/P EST MOD 30 MIN: CPT

## 2020-01-22 NOTE — PHYSICAL EXAM
[General Appearance - Alert] : alert [General Appearance - In No Acute Distress] : in no acute distress [Sclera] : the sclera and conjunctiva were normal [Outer Ear] : the ears and nose were normal in appearance [PERRL With Normal Accommodation] : pupils were equal in size, round, and reactive to light [Neck Appearance] : the appearance of the neck was normal [Apical Impulse] : the apical impulse was normal [Respiration, Rhythm And Depth] : normal respiratory rhythm and effort [Heart Rate And Rhythm] : heart rate was normal and rhythm regular [FreeTextEntry1] : +ve biol le edema, L>R [Bowel Sounds] : normal bowel sounds [Abdomen Soft] : soft [Abnormal Walk] : normal gait [Musculoskeletal - Swelling] : no joint swelling seen [Skin Color & Pigmentation] : normal skin color and pigmentation [] : no rash [Oriented To Time, Place, And Person] : oriented to person, place, and time [Impaired Insight] : insight and judgment were intact

## 2020-01-22 NOTE — HISTORY OF PRESENT ILLNESS
[FreeTextEntry1] : 85 yo female initially referred by Dr. 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 - BP remains "best in 20 years" - is still c/o some le edema more so L leg ( side she had sciatica and weakness along with muscle atrophy, not to mention vein harvesting when she had CABG),exacerbated by combination of guanfacin, nifedipine/CCB however is tolerating it and using compression stockings - \par        SNa 142 (best in years), had been fluctuating between 126 and 138 since 2005 (126 in 2005, 138 4/2018, 127 8/2018), serum sodium very low during hospitalization in 10/2017 at 258, urine osm also low at that time 350 as was urine sodium 27 suggesting lack of solute intake with ongoing hypotonic fluid intake - has hx of poorly controlled htn and is taking guanfacin ( alpha 2 adrenergic), valsartan, metoprolol succ er 100mg and metoprolol 25mg - is very nauseated, not dizzy or falling - reports hx of severe uncontrolled HTN and renal angioplasty in 4/2013 -.

## 2020-01-22 NOTE — ASSESSMENT
[FreeTextEntry1] : Pleasant 83 yo female with hx of previously poorly controlled HTN and chronic hyponatremia - both SNa and BP markedly improved  on nifedipine and metoprolol, guanfacin. Her sodium is also improved (140's). Her back pain is also improved  - off pain meds besides tylenol - mild le edema sec to CCB, vein harvesting, debilitation/immobility, stasis - using support stockings; bp wnl tolerating reduced dose of metoprolol from 125mg to 100mg and bp stable at this time - reduced metoprolol further to 50mg /day.

## 2020-01-30 ENCOUNTER — TRANSCRIPTION ENCOUNTER (OUTPATIENT)
Age: 85
End: 2020-01-30

## 2020-03-10 ENCOUNTER — APPOINTMENT (OUTPATIENT)
Dept: GASTROENTEROLOGY | Facility: CLINIC | Age: 85
End: 2020-03-10
Payer: MEDICARE

## 2020-03-10 VITALS
SYSTOLIC BLOOD PRESSURE: 122 MMHG | HEIGHT: 62 IN | WEIGHT: 126 LBS | DIASTOLIC BLOOD PRESSURE: 80 MMHG | BODY MASS INDEX: 23.19 KG/M2

## 2020-03-10 PROCEDURE — 99214 OFFICE O/P EST MOD 30 MIN: CPT

## 2020-03-10 NOTE — PHYSICAL EXAM
[Bowel Sounds] : normal bowel sounds [Abdomen Soft] : soft [Abdomen Tenderness] : non-tender [] : no hepato-splenomegaly [Abdomen Mass (___ Cm)] : no abdominal mass palpated [Normal Sphincter Tone] : normal sphincter tone [No Rectal Mass] : no rectal mass [Occult Blood Positive] : stool was negative for occult blood [FreeTextEntry1] : no stool is present in rectum

## 2020-03-10 NOTE — HISTORY OF PRESENT ILLNESS
[FreeTextEntry1] : patient presents primarily to discuss having colonoscopy.\par \par she is seeing dr Melissa for her hypertension, and this is working very well.\par she also has been stable from cardiac standpoint, and saw dr duarte on jan 6th, 2020\par \par however, one week ago, about midday, while doing txes and sitting at desk, she had onset of pressure like left anterior chest pain, without radiation, without shortness of breath, but with anxiety.\par \par she noted some improvement.\par has nitrates, but didn’t use\par \par took gaviscon\nicole had eaten her nl breakfast, eight am\par \nicole had her last colonoscopy seven years ago

## 2020-03-10 NOTE — ASSESSMENT
[FreeTextEntry1] : 1.  recent episode of pressure like chest pain.\par \par 2.  discussion of colonoscopy screenig and fu\par \par plan;\par i reached out to dr Pérez, the patient's cardiologist\par 2.  have discussed patient's recent episode with her\par 3.  she sugeested that Marium call her office and set up appointment, so I have communicated this to Marium\par 4.  the plan now is to defer colonoscopy, have marium rv in a few months\par \par AS WE OBTAIN INFORMED CONSENT FOR COLONOSCOPY, UPPER ENDOSCOPY [EGD], OR BOTH PROCEDURES TOGETHER;\par \par As with all procedures, there are risks of which the patient should be aware\par \par 1.  Anesthesia; deep sedation with Propofol;  there is a small risk of aspiration and pulmonary infection.  The Anesthesiologist meets with the patient the morning of the procedure to discuss in more detail\par \par 2.  risk of bleeding; from removal of a polyp, or rarely, from biopsies, 1 % or less\par \par 3.  risk of injury or perforation of the colon or upper GI tract; one in a thousand or less,  from removing a polyp or from advancing the instrument\par \par \par

## 2020-03-12 ENCOUNTER — APPOINTMENT (OUTPATIENT)
Dept: CARDIOLOGY | Facility: CLINIC | Age: 85
End: 2020-03-12
Payer: MEDICARE

## 2020-03-12 ENCOUNTER — NON-APPOINTMENT (OUTPATIENT)
Age: 85
End: 2020-03-12

## 2020-03-12 VITALS
SYSTOLIC BLOOD PRESSURE: 141 MMHG | OXYGEN SATURATION: 97 % | WEIGHT: 129 LBS | HEART RATE: 93 BPM | BODY MASS INDEX: 23.59 KG/M2 | DIASTOLIC BLOOD PRESSURE: 58 MMHG | TEMPERATURE: 98.2 F

## 2020-03-12 PROCEDURE — 93000 ELECTROCARDIOGRAM COMPLETE: CPT

## 2020-03-12 PROCEDURE — 99214 OFFICE O/P EST MOD 30 MIN: CPT

## 2020-03-12 NOTE — PHYSICAL EXAM
[General Appearance - Well Developed] : well developed [General Appearance - Well Nourished] : well nourished [Normal Conjunctiva] : the conjunctiva exhibited no abnormalities [Auscultation Breath Sounds / Voice Sounds] : lungs were clear to auscultation bilaterally [Heart Rate And Rhythm] : heart rate and rhythm were normal [Heart Sounds] : normal S1 and S2 [Murmurs] : no murmurs present [Bowel Sounds] : normal bowel sounds [Abdomen Soft] : soft [Abdomen Tenderness] : non-tender [Abnormal Walk] : normal gait [Nail Clubbing] : no clubbing of the fingernails [Skin Color & Pigmentation] : normal skin color and pigmentation [Oriented To Time, Place, And Person] : oriented to person, place, and time [FreeTextEntry1] : Trace edema b/l

## 2020-03-12 NOTE — HISTORY OF PRESENT ILLNESS
[FreeTextEntry1] : She is here because Dr Ayala asked her to, because of an episode of chest pain\par \par Last Thursday (a week ago), while sitting before lunch, she had an episode of left-sided chest pressure, quite severe -. lessened after after 2-3 minutes -.> took carafate, imdur, ate a light lunch and took an hr nap -. woke up feeling better but still with a lingering feeling \par \par Has back pain, pretty much daily, lasting about an hour or so with treatment such as heat\par \par She still goes up and down a steep driveway a few times a day, w/o CP or GALINDO\par Also manages her stairs many times a day\par \par Yesterday, she had a bad episode of heartburn,lasting about 8 Hrs (4 -11:30P M)

## 2020-03-16 ENCOUNTER — RESULT REVIEW (OUTPATIENT)
Age: 85
End: 2020-03-16

## 2020-04-27 ENCOUNTER — APPOINTMENT (OUTPATIENT)
Dept: CARDIOLOGY | Facility: CLINIC | Age: 85
End: 2020-04-27

## 2020-05-20 ENCOUNTER — APPOINTMENT (OUTPATIENT)
Dept: NEPHROLOGY | Facility: CLINIC | Age: 85
End: 2020-05-20
Payer: MEDICARE

## 2020-05-20 VITALS
HEART RATE: 88 BPM | WEIGHT: 126 LBS | DIASTOLIC BLOOD PRESSURE: 70 MMHG | HEIGHT: 62 IN | TEMPERATURE: 97.1 F | BODY MASS INDEX: 23.19 KG/M2 | SYSTOLIC BLOOD PRESSURE: 130 MMHG

## 2020-05-20 PROCEDURE — 99214 OFFICE O/P EST MOD 30 MIN: CPT

## 2020-05-20 NOTE — HISTORY OF PRESENT ILLNESS
[FreeTextEntry1] : 84 yo female initially referred by Dr. Ayala for hyponatremia and uncontrolled HTN - SNa and BP improved on current meds-\par  - nausea improved as is back pain since having synovial cyst removed from spine - BP remains "best in 20 years"\par  - is still c/o some le edema more so L leg ( side she had sciatica and weakness along with muscle atrophy, not to mention vein harvesting when she had CABG),exacerbated by combination of guanfacin, nifedipine/CCB however is tolerating it and using compression stockings - \par - SNa 142 (best in years), had been fluctuating between 126 and 138 since 2005 (126 in 2005, 138 4/2018, 127 8/2018), serum sodium very low during hospitalization in 10/2017 at 258, urine osm also low at that time 350 as was urine sodium 27 suggesting lack of solute intake with ongoing hypotonic fluid intake - has hx of poorly controlled htn and is taking guanfacin ( alpha 2 adrenergic), valsartan, metoprolol succ er 100mg and metoprolol 25mg - is very nauseated, not dizzy or falling - reports hx of severe uncontrolled HTN and renal angioplasty in 4/2013 -.

## 2020-05-20 NOTE — PHYSICAL EXAM
[General Appearance - In No Acute Distress] : in no acute distress [General Appearance - Alert] : alert [Sclera] : the sclera and conjunctiva were normal [PERRL With Normal Accommodation] : pupils were equal in size, round, and reactive to light [Outer Ear] : the ears and nose were normal in appearance [Neck Appearance] : the appearance of the neck was normal [Respiration, Rhythm And Depth] : normal respiratory rhythm and effort [Heart Rate And Rhythm] : heart rate was normal and rhythm regular [Apical Impulse] : the apical impulse was normal [Bowel Sounds] : normal bowel sounds [Abnormal Walk] : normal gait [Abdomen Soft] : soft [Musculoskeletal - Swelling] : no joint swelling seen [Skin Color & Pigmentation] : normal skin color and pigmentation [] : no rash [Oriented To Time, Place, And Person] : oriented to person, place, and time [Impaired Insight] : insight and judgment were intact [FreeTextEntry1] : +ve biol le edema, L>R

## 2020-05-20 NOTE — ASSESSMENT
[FreeTextEntry1] : Pleasant 86 yo female with hx of previously poorly controlled HTN and chronic hyponatremia \par  - both SNa and BP markedly improved  on nifedipine and metoprolol, guanfacin. Her sodium is also improved (140's). Her back pain is also improved \par  - off pain meds besides tylenol - mild le edema sec to CCB, vein harvesting, debilitation/immobility, stasis\par  - using support stockings; bp wnl tolerating reduced dose of metoprolol of 50mg ( was tapered from 125mg to 100mg and bp stable at this time)

## 2020-05-21 ENCOUNTER — APPOINTMENT (OUTPATIENT)
Dept: CARDIOLOGY | Facility: CLINIC | Age: 85
End: 2020-05-21
Payer: MEDICARE

## 2020-05-21 VITALS
HEART RATE: 78 BPM | OXYGEN SATURATION: 97 % | BODY MASS INDEX: 23.41 KG/M2 | DIASTOLIC BLOOD PRESSURE: 64 MMHG | SYSTOLIC BLOOD PRESSURE: 130 MMHG | WEIGHT: 128 LBS

## 2020-05-21 PROCEDURE — 99214 OFFICE O/P EST MOD 30 MIN: CPT

## 2020-05-21 NOTE — REASON FOR VISIT
[Follow-Up - Clinic] : a clinic follow-up of [Chest Pain] : chest pain [Coronary Artery Disease] : coronary artery disease [Hypertension] : hypertension [Hyperlipidemia] : hyperlipidemia

## 2020-05-21 NOTE — PHYSICAL EXAM
[General Appearance - Well Developed] : well developed [General Appearance - Well Nourished] : well nourished [Normal Conjunctiva] : the conjunctiva exhibited no abnormalities [Heart Rate And Rhythm] : heart rate and rhythm were normal [Auscultation Breath Sounds / Voice Sounds] : lungs were clear to auscultation bilaterally [FreeTextEntry1] : Trace edema b/l [Heart Sounds] : normal S1 and S2 [Murmurs] : no murmurs present [Abdomen Soft] : soft [Bowel Sounds] : normal bowel sounds [Abdomen Tenderness] : non-tender [Nail Clubbing] : no clubbing of the fingernails [Abnormal Walk] : normal gait [Skin Color & Pigmentation] : normal skin color and pigmentation [Oriented To Time, Place, And Person] : oriented to person, place, and time

## 2020-05-21 NOTE — HISTORY OF PRESENT ILLNESS
[FreeTextEntry1] : Doing ok\par Had a bad sore throat  5 days after the nuclear -. went to [facility] where strep was neg\par She now wonders about the covid-19 then\par \par She has been doing well, denying chest pain, shortness of breath, palpitations or dizziness\par She has been doing yard work

## 2020-05-28 LAB
ANION GAP SERPL CALC-SCNC: 14 MMOL/L
BUN SERPL-MCNC: 14 MG/DL
CALCIUM SERPL-MCNC: 9.7 MG/DL
CHLORIDE SERPL-SCNC: 97 MMOL/L
CHOLEST SERPL-MCNC: 153 MG/DL
CHOLEST/HDLC SERPL: 2.7 RATIO
CO2 SERPL-SCNC: 28 MMOL/L
CREAT SERPL-MCNC: 0.84 MG/DL
GLUCOSE SERPL-MCNC: 103 MG/DL
HDLC SERPL-MCNC: 56 MG/DL
LDLC SERPL CALC-MCNC: 66 MG/DL
POTASSIUM SERPL-SCNC: 5 MMOL/L
SODIUM SERPL-SCNC: 139 MMOL/L
TRIGL SERPL-MCNC: 158 MG/DL

## 2020-08-26 ENCOUNTER — APPOINTMENT (OUTPATIENT)
Dept: ENDOCRINOLOGY | Facility: CLINIC | Age: 85
End: 2020-08-26
Payer: MEDICARE

## 2020-08-26 VITALS
SYSTOLIC BLOOD PRESSURE: 135 MMHG | BODY MASS INDEX: 22.82 KG/M2 | DIASTOLIC BLOOD PRESSURE: 80 MMHG | HEIGHT: 62 IN | WEIGHT: 124 LBS | HEART RATE: 77 BPM | RESPIRATION RATE: 95 BRPM

## 2020-08-26 DIAGNOSIS — E11.9 TYPE 2 DIABETES MELLITUS W/OUT COMPLICATIONS: ICD-10-CM

## 2020-08-26 PROCEDURE — 99214 OFFICE O/P EST MOD 30 MIN: CPT | Mod: 25

## 2020-08-26 PROCEDURE — 36415 COLL VENOUS BLD VENIPUNCTURE: CPT

## 2020-08-27 ENCOUNTER — RESULT CHARGE (OUTPATIENT)
Age: 85
End: 2020-08-27

## 2020-08-27 PROBLEM — E11.9 DIABETES MELLITUS TYPE 2 IN NONOBESE: Status: RESOLVED | Noted: 2020-08-26 | Resolved: 2020-08-27

## 2020-08-27 LAB
25(OH)D3 SERPL-MCNC: 41 NG/ML
ALBUMIN SERPL ELPH-MCNC: 5 G/DL
ALP BLD-CCNC: 57 U/L
ALT SERPL-CCNC: 8 U/L
ANION GAP SERPL CALC-SCNC: 17 MMOL/L
AST SERPL-CCNC: 25 U/L
BILIRUB DIRECT SERPL-MCNC: 0.1 MG/DL
BILIRUB INDIRECT SERPL-MCNC: 0.2 MG/DL
BILIRUB SERPL-MCNC: 0.4 MG/DL
BUN SERPL-MCNC: 23 MG/DL
CALCIUM SERPL-MCNC: 9.5 MG/DL
CALCIUM SERPL-MCNC: 9.5 MG/DL
CHLORIDE SERPL-SCNC: 98 MMOL/L
CO2 SERPL-SCNC: 24 MMOL/L
CREAT SERPL-MCNC: 0.88 MG/DL
FOLATE SERPL-MCNC: 17.5 NG/ML
GLUCOSE SERPL-MCNC: 95 MG/DL
PARATHYROID HORMONE INTACT: 52 PG/ML
PHOSPHATE SERPL-MCNC: 4.4 MG/DL
POTASSIUM SERPL-SCNC: 4.6 MMOL/L
PROT SERPL-MCNC: 7.4 G/DL
SODIUM SERPL-SCNC: 140 MMOL/L
VIT B12 SERPL-MCNC: 575 PG/ML
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
## 2020-08-27 NOTE — PHYSICAL EXAM
[Alert] : alert [Well Nourished] : well nourished [Healthy Appearance] : healthy appearance [No Acute Distress] : no acute distress [Well Developed] : well developed [No Proptosis] : no proptosis [No Respiratory Distress] : no respiratory distress [No Accessory Muscle Use] : no accessory muscle use [Normal Rate] : heart rate was normal [No Stigmata of Cushings Syndrome] : no stigmata of Cushings Syndrome [Normal Gait] : normal gait [No Involuntary Movements] : no involuntary movements were seen [No Tremors] : no tremors [Oriented x3] : oriented to person, place, and time [Normal Affect] : the affect was normal [Normal Insight/Judgement] : insight and judgment were intact [Normal Mood] : the mood was normal

## 2020-10-03 ENCOUNTER — RX RENEWAL (OUTPATIENT)
Age: 85
End: 2020-10-03

## 2020-10-05 ENCOUNTER — RX RENEWAL (OUTPATIENT)
Age: 85
End: 2020-10-05

## 2020-10-21 ENCOUNTER — APPOINTMENT (OUTPATIENT)
Dept: NEPHROLOGY | Facility: CLINIC | Age: 85
End: 2020-10-21
Payer: MEDICARE

## 2020-10-21 ENCOUNTER — RESULT REVIEW (OUTPATIENT)
Age: 85
End: 2020-10-21

## 2020-10-21 VITALS
OXYGEN SATURATION: 96 % | HEIGHT: 62 IN | WEIGHT: 121 LBS | BODY MASS INDEX: 22.26 KG/M2 | HEART RATE: 86 BPM | TEMPERATURE: 97.3 F | DIASTOLIC BLOOD PRESSURE: 70 MMHG | SYSTOLIC BLOOD PRESSURE: 138 MMHG

## 2020-10-21 PROCEDURE — 99214 OFFICE O/P EST MOD 30 MIN: CPT | Mod: 25

## 2020-10-21 PROCEDURE — 36415 COLL VENOUS BLD VENIPUNCTURE: CPT

## 2020-10-21 NOTE — HISTORY OF PRESENT ILLNESS
[FreeTextEntry1] : 84 yo female initially referred by Dr. 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

## 2020-10-21 NOTE — PHYSICAL EXAM
[General Appearance - Alert] : alert [General Appearance - In No Acute Distress] : in no acute distress [Sclera] : the sclera and conjunctiva were normal [PERRL With Normal Accommodation] : pupils were equal in size, round, and reactive to light [Outer Ear] : the ears and nose were normal in appearance [Neck Appearance] : the appearance of the neck was normal [Respiration, Rhythm And Depth] : normal respiratory rhythm and effort [Apical Impulse] : the apical impulse was normal [Heart Rate And Rhythm] : heart rate was normal and rhythm regular [Bowel Sounds] : normal bowel sounds [Abdomen Soft] : soft [Abnormal Walk] : normal gait [Musculoskeletal - Swelling] : no joint swelling seen [Skin Color & Pigmentation] : normal skin color and pigmentation [] : no rash [Oriented To Time, Place, And Person] : oriented to person, place, and time [Impaired Insight] : insight and judgment were intact [Cranial Nerves] : cranial nerves 2-12 were intact [No Focal Deficits] : no focal deficits [FreeTextEntry1] : +ve biol le edema, L>R

## 2020-11-20 ENCOUNTER — NON-APPOINTMENT (OUTPATIENT)
Age: 85
End: 2020-11-20

## 2020-11-20 ENCOUNTER — APPOINTMENT (OUTPATIENT)
Dept: CARDIOLOGY | Facility: CLINIC | Age: 85
End: 2020-11-20
Payer: MEDICARE

## 2020-11-20 VITALS
WEIGHT: 124 LBS | DIASTOLIC BLOOD PRESSURE: 70 MMHG | HEART RATE: 88 BPM | SYSTOLIC BLOOD PRESSURE: 158 MMHG | TEMPERATURE: 97.1 F | BODY MASS INDEX: 22.68 KG/M2 | OXYGEN SATURATION: 97 %

## 2020-11-20 PROCEDURE — 99214 OFFICE O/P EST MOD 30 MIN: CPT

## 2020-11-20 PROCEDURE — 93000 ELECTROCARDIOGRAM COMPLETE: CPT

## 2020-12-23 ENCOUNTER — APPOINTMENT (OUTPATIENT)
Dept: GASTROENTEROLOGY | Facility: CLINIC | Age: 85
End: 2020-12-23
Payer: MEDICARE

## 2020-12-23 PROCEDURE — 99443: CPT | Mod: 95

## 2020-12-23 NOTE — HISTORY OF PRESENT ILLNESS
[FreeTextEntry1] : 1.  coronary artery disease...\par \par 2.  recent stress test this year\par \par 3.  blood pressure is doing well\par \par 4.  had some edema with calcium blockers; edema is gone\par \par no angina\par \par nothing like the episode of chest pain and pressure in march\par \par with flu shot, she gets severe flu like sx, so she doesn’t get one...\par \par \par even had response to half a vaccine\par \par she recallls three dramatic responses to medication\par with nitrous oxide\par after colon resection\par had antibiotics\par then...c difficile develped while on the antibiotics.\par \par and was treated with oral vancomycin..\par \par third episode..ulcerated rash..hydrochlorothiazide for her blood pressure.\par skin biopsy confirmed it was an allergic reaction, source not clearly proven\par \par correction; severe rash, scarletiform, to vancomycin..\par \par sees dr LAURITA rajput yearly..\par \par one and a half year since she saw gyn..\par sees Larry Mendelowitz\par up to date on mammos\par \par \par \par \par

## 2020-12-23 NOTE — ASSESSMENT
[FreeTextEntry1] : 1.  patient is doing well from nearly all respects\par \par 2.  coronary disease seems well controlled\par \par 3.  patient has no specific issues, other than when it is time to address whether she receives or does not receive the vaccine\par \par 4.  sees the appropriate \par \par mds\par \par 5.  patient  s brother had metastatic colon cancer, but may have started in the ileum..\par and also, other brother had colon cancer, and her other brother had melanoma\par \par father had prostate cancer\par fathers mother had perhaps stomach cancer\par \par has been seven years since she had colonoscopyno ;  no significant recal bleeding\par had ten days of diarrhea during the summer, none since that time\par \par and it was self limited\par \par \par plan;\par 1.  continuing all the medical followup ; patient is extremely compliant\par 2.  medical issues are under control by and large, especially bp, and nothing to suggest angina is acting up, last stress negative, last c.p. was in March, and I addressed it with Marium at the time\par 3.  will see Marium in the office in six or eight weeks, would like to do a physical\par 4.  we are settled on issues of flu, pneumonia, and perhaps the Shingles vaccine...patient did have shingles clinically...this was the year she had kidney angioplasty, coronary angioplasty\par \par 5.  we have yet to fully address what we do when the covid immun is available...depends on which vaccines are on the table \par we will make an appt for labs for Marium\par \par patient is on rosuvastatin or cresto 5 mg per day...tolerated well.\par \par More than 50% of the face to face time was devoted to counseling and /or coordination of care.  THis coordination of care may have included reviewing other medical notes and reports, and communicating with other health professionals\par

## 2021-01-03 ENCOUNTER — RX RENEWAL (OUTPATIENT)
Age: 86
End: 2021-01-03

## 2021-01-20 ENCOUNTER — APPOINTMENT (OUTPATIENT)
Dept: NEPHROLOGY | Facility: CLINIC | Age: 86
End: 2021-01-20
Payer: MEDICARE

## 2021-01-20 ENCOUNTER — RESULT REVIEW (OUTPATIENT)
Age: 86
End: 2021-01-20

## 2021-01-20 VITALS
DIASTOLIC BLOOD PRESSURE: 70 MMHG | TEMPERATURE: 97.2 F | HEIGHT: 62 IN | WEIGHT: 121 LBS | HEART RATE: 71 BPM | OXYGEN SATURATION: 98 % | BODY MASS INDEX: 22.26 KG/M2 | SYSTOLIC BLOOD PRESSURE: 130 MMHG

## 2021-01-20 PROCEDURE — 99214 OFFICE O/P EST MOD 30 MIN: CPT | Mod: 25

## 2021-01-20 PROCEDURE — 36415 COLL VENOUS BLD VENIPUNCTURE: CPT

## 2021-01-20 NOTE — HISTORY OF PRESENT ILLNESS
[FreeTextEntry1] : 84 yo female initially referred by Dr. 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),exacerbated by combination of guanfacin, nifedipine/CCB however is tolerating it and using compression stockings - \par - SNa 137 ( 10/2020), 140 (8/2020), had been fluctuating between 126 and 138 since 2005 (126 in 2005, 138 4/2018, 127 8/2018),\par - serum sodium very low during hospitalization in 10/2017 at 258, urine osm also low at that time 350 as was urine sodium 27 suggesting lack of solute intake with ongoing hypotonic fluid intake\par \par - has longstanding hx of poorly controlled htn and is taking guanfacin ( alpha 2 adrenergic), valsartan, metoprolol succ er 100mg and metoprolol 25mg \par

## 2021-01-20 NOTE — ASSESSMENT
[FreeTextEntry1] : Pleasant 86 yo female with hx of previously poorly controlled HTN and chronic hyponatremia \par \par  - both SNa and BP markedly improved  on nifedipine and metoprolol, guanfacin. Her sodium is also improved (140's).\par  Her back pain is also improved \par - off pain meds besides tylenol \par - edema sec to CCB, vein harvesting, debilitation/immobility, stasis - improved with exercise and weight loss\par -  no longer using support stockings; \par \par bp wnl tolerating reduced dose of metoprolol of 50mg ( was tapered from 125mg to 100mg and bp stable at this time)

## 2021-01-22 ENCOUNTER — RX RENEWAL (OUTPATIENT)
Age: 86
End: 2021-01-22

## 2021-01-29 RX ORDER — CIPROFLOXACIN HYDROCHLORIDE 250 MG/1
250 TABLET, FILM COATED ORAL
Qty: 20 | Refills: 1 | Status: DISCONTINUED | COMMUNITY
Start: 2020-08-29 | End: 2021-01-29

## 2021-02-02 ENCOUNTER — APPOINTMENT (OUTPATIENT)
Dept: OBGYN | Facility: CLINIC | Age: 86
End: 2021-02-02
Payer: MEDICARE

## 2021-02-03 ENCOUNTER — RX RENEWAL (OUTPATIENT)
Age: 86
End: 2021-02-03

## 2021-02-23 ENCOUNTER — APPOINTMENT (OUTPATIENT)
Dept: OBGYN | Facility: CLINIC | Age: 86
End: 2021-02-23
Payer: MEDICARE

## 2021-02-23 VITALS
BODY MASS INDEX: 22.26 KG/M2 | SYSTOLIC BLOOD PRESSURE: 130 MMHG | WEIGHT: 121 LBS | HEIGHT: 62 IN | DIASTOLIC BLOOD PRESSURE: 70 MMHG

## 2021-02-23 PROCEDURE — G0101: CPT

## 2021-02-24 ENCOUNTER — NON-APPOINTMENT (OUTPATIENT)
Age: 86
End: 2021-02-24

## 2021-02-24 ENCOUNTER — APPOINTMENT (OUTPATIENT)
Dept: ENDOCRINOLOGY | Facility: CLINIC | Age: 86
End: 2021-02-24
Payer: MEDICARE

## 2021-02-24 VITALS
SYSTOLIC BLOOD PRESSURE: 125 MMHG | HEIGHT: 62 IN | BODY MASS INDEX: 22.82 KG/M2 | OXYGEN SATURATION: 97 % | DIASTOLIC BLOOD PRESSURE: 78 MMHG | WEIGHT: 124 LBS | HEART RATE: 71 BPM

## 2021-02-24 PROCEDURE — 99214 OFFICE O/P EST MOD 30 MIN: CPT
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
## 2021-02-24 NOTE — ASSESSMENT
[FreeTextEntry1] : Tolerating Prolia well.\par Next bone density March 18 when she goes for next Prolia.\par ROV in July

## 2021-03-10 ENCOUNTER — APPOINTMENT (OUTPATIENT)
Dept: GASTROENTEROLOGY | Facility: CLINIC | Age: 86
End: 2021-03-10
Payer: MEDICARE

## 2021-03-10 VITALS
SYSTOLIC BLOOD PRESSURE: 122 MMHG | HEIGHT: 62 IN | HEART RATE: 81 BPM | DIASTOLIC BLOOD PRESSURE: 70 MMHG | BODY MASS INDEX: 23 KG/M2 | TEMPERATURE: 97.1 F | WEIGHT: 125 LBS

## 2021-03-10 PROCEDURE — 99214 OFFICE O/P EST MOD 30 MIN: CPT

## 2021-03-13 NOTE — HISTORY OF PRESENT ILLNESS
[FreeTextEntry1] : patient is here for comprehensive physical exam, not done for a while\par \par 1.  she has hx of coronary artery disease, history of bypass, history of coronary disease with stents..\par last stress test with Dr Carter was in March 2020, and there were no changes in her regimen\par \par she has very infrequent chest pain, generallly in the winter..\par \par 2.  cv notes, dr Carter, reviewed, 2020, and there is stable angina pattern, but very frequent PVC's up to trigeminy\par \par 3.  finding of a mild as described bladder prolapse, or 'cystocele', and a Urogynecologist...\par \par 4.  patient is otherwise well in every respect

## 2021-03-13 NOTE — ASSESSMENT
[FreeTextEntry1] : 1.  overall alhaji remains good\par \par \par 2.  cardiovascular still remains the major issue..\par and currently minimal sx, and dr Quintero appeared satisfied with most recent stress test, last march\par \par 3.  patient bp is much better controlled, with Dr Zapata and his excellent regimen\par \par 4.  time for colonoscopy, none done for nine years, 2012, and patients two of three sibs had ca of colon\par \par 5..lipid studies...fasting to be done\par \par 6.  set up colonoscopy for after her next shot which is in four weeks, the second moderna...\par \par plan;\par 1.  check lipid studies\par 2.  patient has multiple pvc's, and complex coronary disease; i will reach out to Dr Quintero before the colonsocpy\par including best management if any for the pvc's\par 3.  note; when i checked pulse, i was not aware of irreg or premature beats at this time\par 4.  patient has had shingles, but she could also still get the shinrix vaccine\par 5.  pneumonia vaccine..vaccine..she doesn’t get these because she gets quite ill with vaccines..

## 2021-03-13 NOTE — PHYSICAL EXAM
[Abdomen Soft] : soft [Abdomen Tenderness] : non-tender [] : no hepato-splenomegaly [FreeTextEntry1] : no abnormalities noted

## 2021-03-16 ENCOUNTER — APPOINTMENT (OUTPATIENT)
Dept: GASTROENTEROLOGY | Facility: CLINIC | Age: 86
End: 2021-03-16
Payer: MEDICARE

## 2021-03-16 PROCEDURE — 36415 COLL VENOUS BLD VENIPUNCTURE: CPT

## 2021-03-17 LAB
ALBUMIN SERPL ELPH-MCNC: 5 G/DL
ALP BLD-CCNC: 64 U/L
ALT SERPL-CCNC: 9 U/L
ANION GAP SERPL CALC-SCNC: 14 MMOL/L
AST SERPL-CCNC: 35 U/L
BASOPHILS # BLD AUTO: 0.04 K/UL
BASOPHILS NFR BLD AUTO: 0.5 %
BILIRUB SERPL-MCNC: 0.6 MG/DL
BUN SERPL-MCNC: 20 MG/DL
CALCIUM SERPL-MCNC: 10 MG/DL
CHLORIDE SERPL-SCNC: 100 MMOL/L
CHOLEST SERPL-MCNC: 178 MG/DL
CO2 SERPL-SCNC: 27 MMOL/L
CREAT SERPL-MCNC: 0.78 MG/DL
EOSINOPHIL # BLD AUTO: 0.21 K/UL
EOSINOPHIL NFR BLD AUTO: 2.4 %
GLUCOSE SERPL-MCNC: 104 MG/DL
HCT VFR BLD CALC: 45.9 %
HDLC SERPL-MCNC: 70 MG/DL
HGB BLD-MCNC: 14.3 G/DL
IMM GRANULOCYTES NFR BLD AUTO: 0.2 %
LDLC SERPL CALC-MCNC: 80 MG/DL
LYMPHOCYTES # BLD AUTO: 2.13 K/UL
LYMPHOCYTES NFR BLD AUTO: 24.7 %
MAN DIFF?: NORMAL
MCHC RBC-ENTMCNC: 30.7 PG
MCHC RBC-ENTMCNC: 31.2 GM/DL
MCV RBC AUTO: 98.5 FL
MONOCYTES # BLD AUTO: 0.48 K/UL
MONOCYTES NFR BLD AUTO: 5.6 %
NEUTROPHILS # BLD AUTO: 5.76 K/UL
NEUTROPHILS NFR BLD AUTO: 66.6 %
NONHDLC SERPL-MCNC: 108 MG/DL
PLATELET # BLD AUTO: 325 K/UL
POTASSIUM SERPL-SCNC: 4 MMOL/L
PROT SERPL-MCNC: 8 G/DL
RBC # BLD: 4.66 M/UL
RBC # FLD: 13.8 %
SODIUM SERPL-SCNC: 140 MMOL/L
TRIGL SERPL-MCNC: 144 MG/DL
WBC # FLD AUTO: 8.64 K/UL

## 2021-03-18 ENCOUNTER — RESULT REVIEW (OUTPATIENT)
Age: 86
End: 2021-03-18

## 2021-03-23 ENCOUNTER — RESULT REVIEW (OUTPATIENT)
Age: 86
End: 2021-03-23

## 2021-04-27 ENCOUNTER — RESULT REVIEW (OUTPATIENT)
Age: 86
End: 2021-04-27

## 2021-04-30 ENCOUNTER — APPOINTMENT (OUTPATIENT)
Dept: GASTROENTEROLOGY | Facility: HOSPITAL | Age: 86
End: 2021-04-30

## 2021-05-04 ENCOUNTER — RX RENEWAL (OUTPATIENT)
Age: 86
End: 2021-05-04

## 2021-05-19 ENCOUNTER — APPOINTMENT (OUTPATIENT)
Dept: NEPHROLOGY | Facility: CLINIC | Age: 86
End: 2021-05-19
Payer: MEDICARE

## 2021-05-19 VITALS
WEIGHT: 124 LBS | OXYGEN SATURATION: 96 % | TEMPERATURE: 97.8 F | BODY MASS INDEX: 22.82 KG/M2 | HEIGHT: 62 IN | SYSTOLIC BLOOD PRESSURE: 124 MMHG | DIASTOLIC BLOOD PRESSURE: 64 MMHG | HEART RATE: 77 BPM

## 2021-05-19 PROCEDURE — 99214 OFFICE O/P EST MOD 30 MIN: CPT

## 2021-05-19 NOTE — PHYSICAL EXAM
[General Appearance - Alert] : alert [General Appearance - In No Acute Distress] : in no acute distress [PERRL With Normal Accommodation] : pupils were equal in size, round, and reactive to light [Sclera] : the sclera and conjunctiva were normal [Outer Ear] : the ears and nose were normal in appearance [Neck Appearance] : the appearance of the neck was normal [Respiration, Rhythm And Depth] : normal respiratory rhythm and effort [Apical Impulse] : the apical impulse was normal [Heart Rate And Rhythm] : heart rate was normal and rhythm regular [Bowel Sounds] : normal bowel sounds [Abdomen Soft] : soft [Abnormal Walk] : normal gait [Musculoskeletal - Swelling] : no joint swelling seen [Skin Color & Pigmentation] : normal skin color and pigmentation [] : no rash [Cranial Nerves] : cranial nerves 2-12 were intact [No Focal Deficits] : no focal deficits [Oriented To Time, Place, And Person] : oriented to person, place, and time [Impaired Insight] : insight and judgment were intact [FreeTextEntry1] : +ve biol le edema, L>R

## 2021-05-19 NOTE — HISTORY OF PRESENT ILLNESS
[FreeTextEntry1] : 85 yo female initially referred by Dr. 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),exacerbated by combination of guanfacin, nifedipine/CCB however is tolerating it and using compression stockings - \par - SNa 137 ( 10/2020), 140 (8/2020), had been fluctuating between 126 and 138 since 2005 (126 in 2005, 138 4/2018, 127 8/2018),\par - serum sodium very low during hospitalization in 10/2017 at 258, urine osm also low at that time 350 as was urine sodium 27 suggesting lack of solute intake with ongoing hypotonic fluid intake\par \par - has longstanding hx of poorly controlled htn and is taking guanfacin ( alpha 2 adrenergic), valsartan, metoprolol succ er 100mg and metoprolol 25mg \par

## 2021-05-20 ENCOUNTER — APPOINTMENT (OUTPATIENT)
Dept: CARDIOLOGY | Facility: CLINIC | Age: 86
End: 2021-05-20
Payer: MEDICARE

## 2021-05-20 ENCOUNTER — NON-APPOINTMENT (OUTPATIENT)
Age: 86
End: 2021-05-20

## 2021-05-20 VITALS
SYSTOLIC BLOOD PRESSURE: 140 MMHG | HEART RATE: 73 BPM | WEIGHT: 124 LBS | BODY MASS INDEX: 22.68 KG/M2 | DIASTOLIC BLOOD PRESSURE: 62 MMHG | OXYGEN SATURATION: 98 %

## 2021-05-20 PROCEDURE — 93000 ELECTROCARDIOGRAM COMPLETE: CPT

## 2021-05-20 PROCEDURE — 99215 OFFICE O/P EST HI 40 MIN: CPT

## 2021-05-27 ENCOUNTER — RX RENEWAL (OUTPATIENT)
Age: 86
End: 2021-05-27

## 2021-06-08 ENCOUNTER — RESULT REVIEW (OUTPATIENT)
Age: 86
End: 2021-06-08

## 2021-06-10 ENCOUNTER — NON-APPOINTMENT (OUTPATIENT)
Age: 86
End: 2021-06-10

## 2021-06-16 ENCOUNTER — APPOINTMENT (OUTPATIENT)
Dept: ENDOCRINOLOGY | Facility: CLINIC | Age: 86
End: 2021-06-16
Payer: MEDICARE

## 2021-06-16 VITALS
HEART RATE: 86 BPM | DIASTOLIC BLOOD PRESSURE: 72 MMHG | SYSTOLIC BLOOD PRESSURE: 120 MMHG | WEIGHT: 122 LBS | OXYGEN SATURATION: 93 % | BODY MASS INDEX: 22.45 KG/M2 | HEIGHT: 62 IN

## 2021-06-16 PROCEDURE — 99214 OFFICE O/P EST MOD 30 MIN: CPT

## 2021-06-17 ENCOUNTER — NON-APPOINTMENT (OUTPATIENT)
Age: 86
End: 2021-06-17

## 2021-08-15 ENCOUNTER — RX RENEWAL (OUTPATIENT)
Age: 86
End: 2021-08-15

## 2021-08-18 ENCOUNTER — APPOINTMENT (OUTPATIENT)
Dept: NEPHROLOGY | Facility: CLINIC | Age: 86
End: 2021-08-18
Payer: MEDICARE

## 2021-08-18 VITALS
SYSTOLIC BLOOD PRESSURE: 130 MMHG | HEIGHT: 62 IN | OXYGEN SATURATION: 98 % | TEMPERATURE: 97.3 F | BODY MASS INDEX: 22.45 KG/M2 | WEIGHT: 122 LBS | HEART RATE: 71 BPM | DIASTOLIC BLOOD PRESSURE: 70 MMHG

## 2021-08-18 PROCEDURE — 99214 OFFICE O/P EST MOD 30 MIN: CPT

## 2021-08-18 NOTE — HISTORY OF PRESENT ILLNESS
[FreeTextEntry1] : 84 yo female initially referred by Dr. 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 \par - BP remains "best in 20 years"\par - still c/o some le edema more so L leg ( side she had sciatica and weakness along with muscle atrophy, not to mention vein harvesting when she had CABG),exacerbated by combination of guanfacin, nifedipine/CCB however is tolerating it and using compression stockings - \par - SNa 140 (8/2020), had been fluctuating between 126 and 138 since 2005 (126 in 2005, 138 4/2018, 127 8/2018),\par - serum sodium very low during hospitalization in 10/2017 at 258, urine osm also low at that time 350 as was urine sodium 27 suggesting lack of solute intake with ongoing hypotonic fluid intake\par \par - has longstanding hx of poorly controlled htn and is taking guanfacin ( alpha 2 adrenergic), valsartan, metoprolol succ er 100mg and metoprolol 25mg \par \par  recently had ECHO, interestingly found to have 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

## 2021-08-18 NOTE — PHYSICAL EXAM
[General Appearance - Alert] : alert [General Appearance - In No Acute Distress] : in no acute distress [Sclera] : the sclera and conjunctiva were normal [PERRL With Normal Accommodation] : pupils were equal in size, round, and reactive to light [Outer Ear] : the ears and nose were normal in appearance [Neck Appearance] : the appearance of the neck was normal [Respiration, Rhythm And Depth] : normal respiratory rhythm and effort [Apical Impulse] : the apical impulse was normal [Heart Rate And Rhythm] : heart rate was normal and rhythm regular [Bowel Sounds] : normal bowel sounds [Abdomen Soft] : soft [Abnormal Walk] : normal gait [Musculoskeletal - Swelling] : no joint swelling seen [Skin Color & Pigmentation] : normal skin color and pigmentation [] : no rash [Cranial Nerves] : cranial nerves 2-12 were intact [No Focal Deficits] : no focal deficits [Oriented To Time, Place, And Person] : oriented to person, place, and time [Impaired Insight] : insight and judgment were intact [FreeTextEntry1] : +ve biol le edema, L>R

## 2021-08-18 NOTE — ASSESSMENT
[FreeTextEntry1] : Pleasant 85 yo female with hx of previously poorly controlled HTN and chronic hyponatremia \par \par  - both SNa and BP markedly improved  on nifedipine and metoprolol, guanfacin. 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 if she may benefit from tricuspid clip

## 2021-09-07 ENCOUNTER — APPOINTMENT (OUTPATIENT)
Dept: GASTROENTEROLOGY | Facility: CLINIC | Age: 86
End: 2021-09-07
Payer: MEDICARE

## 2021-09-07 VITALS
HEART RATE: 84 BPM | SYSTOLIC BLOOD PRESSURE: 122 MMHG | HEIGHT: 62 IN | DIASTOLIC BLOOD PRESSURE: 78 MMHG | TEMPERATURE: 96.8 F | WEIGHT: 127 LBS | BODY MASS INDEX: 23.37 KG/M2

## 2021-09-07 PROCEDURE — 99214 OFFICE O/P EST MOD 30 MIN: CPT

## 2021-09-07 NOTE — HISTORY OF PRESENT ILLNESS
[FreeTextEntry1] : pre operative visit, prior to having eyelid;  \par \par to be performed by Dr Patrice Liz, a plastics physician who specializes in eye conditions.\par \par surgery is cheduled for sept 23d\par \par there are several medical issues to discuss, but all of  these are under good medical control\par \par re cardio vascular;\par \par 1.  coronary artery disease, hx of CABG\par it appears stable to me\par without significant symptoms, without recent change in symptoms\par \par 2. hypertension,  much better control with Dr Zapata\par

## 2021-09-07 NOTE — ASSESSMENT
[FreeTextEntry1] : 1.  i do not see any contraindication to the proposed surgery\par \par 2.  patient needs to find out how long to hold off on aspirin and plavix prior to surgery\par \par 3.  dr Patrice Liz will be the surgeon performing the operation\par \par 4.  being done as an out patient\par \par 5.  patient is stable re her coronary disease;  saw dr Carter in last several months\par \par patient should take her toprol in the evening, and needs to take it the night before surgery\par \par nifedipine needs to be taken in the am, with small amt of water, take as early as possible\par .time\par \par patient is considered to have a satisfactory status and not to be at any excessive risk for this surgery\par \par She is medically stable\par \par

## 2021-09-13 DIAGNOSIS — Z00.00 ENCOUNTER FOR GENERAL ADULT MEDICAL EXAMINATION W/OUT ABNORMAL FINDINGS: ICD-10-CM

## 2021-09-14 ENCOUNTER — LABORATORY RESULT (OUTPATIENT)
Age: 86
End: 2021-09-14

## 2021-09-14 ENCOUNTER — APPOINTMENT (OUTPATIENT)
Dept: GASTROENTEROLOGY | Facility: CLINIC | Age: 86
End: 2021-09-14
Payer: MEDICARE

## 2021-09-14 PROCEDURE — 36415 COLL VENOUS BLD VENIPUNCTURE: CPT

## 2021-09-14 NOTE — ASSESSMENT
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
## 2021-09-16 LAB
ALBUMIN SERPL ELPH-MCNC: 4.8 G/DL
ALP BLD-CCNC: 60 U/L
ALT SERPL-CCNC: 8 U/L
ANION GAP SERPL CALC-SCNC: 14 MMOL/L
AST SERPL-CCNC: 28 U/L
BASOPHILS # BLD AUTO: 0.03 K/UL
BASOPHILS NFR BLD AUTO: 0.4 %
BILIRUB SERPL-MCNC: 0.7 MG/DL
BUN SERPL-MCNC: 21 MG/DL
CALCIUM SERPL-MCNC: 9.7 MG/DL
CHLORIDE SERPL-SCNC: 99 MMOL/L
CHOLEST SERPL-MCNC: 166 MG/DL
CO2 SERPL-SCNC: 26 MMOL/L
CREAT SERPL-MCNC: 0.86 MG/DL
EOSINOPHIL # BLD AUTO: 0.25 K/UL
EOSINOPHIL NFR BLD AUTO: 3.4 %
GLUCOSE SERPL-MCNC: 107 MG/DL
HCT VFR BLD CALC: 41.4 %
HDLC SERPL-MCNC: 64 MG/DL
HGB BLD-MCNC: 13.3 G/DL
IMM GRANULOCYTES NFR BLD AUTO: 0.1 %
LDLC SERPL CALC-MCNC: 77 MG/DL
LYMPHOCYTES # BLD AUTO: 1.66 K/UL
LYMPHOCYTES NFR BLD AUTO: 22.3 %
MAN DIFF?: NORMAL
MCHC RBC-ENTMCNC: 30.2 PG
MCHC RBC-ENTMCNC: 32.1 GM/DL
MCV RBC AUTO: 93.9 FL
MONOCYTES # BLD AUTO: 0.55 K/UL
MONOCYTES NFR BLD AUTO: 7.4 %
NEUTROPHILS # BLD AUTO: 4.94 K/UL
NEUTROPHILS NFR BLD AUTO: 66.4 %
NONHDLC SERPL-MCNC: 103 MG/DL
PLATELET # BLD AUTO: 291 K/UL
POTASSIUM SERPL-SCNC: 4.3 MMOL/L
PROT SERPL-MCNC: 7.5 G/DL
RBC # BLD: 4.41 M/UL
RBC # FLD: 14 %
SODIUM SERPL-SCNC: 140 MMOL/L
TRIGL SERPL-MCNC: 129 MG/DL
TSH SERPL-ACNC: 5.29 UIU/ML
WBC # FLD AUTO: 7.44 K/UL

## 2021-10-07 ENCOUNTER — RX RENEWAL (OUTPATIENT)
Age: 86
End: 2021-10-07

## 2021-11-21 ENCOUNTER — RX RENEWAL (OUTPATIENT)
Age: 86
End: 2021-11-21

## 2021-12-06 ENCOUNTER — APPOINTMENT (OUTPATIENT)
Dept: CARDIOLOGY | Facility: CLINIC | Age: 86
End: 2021-12-06
Payer: MEDICARE

## 2021-12-06 ENCOUNTER — NON-APPOINTMENT (OUTPATIENT)
Age: 86
End: 2021-12-06

## 2021-12-06 VITALS
WEIGHT: 126 LBS | HEART RATE: 77 BPM | BODY MASS INDEX: 23.05 KG/M2 | OXYGEN SATURATION: 97 % | SYSTOLIC BLOOD PRESSURE: 114 MMHG | DIASTOLIC BLOOD PRESSURE: 54 MMHG

## 2021-12-06 PROCEDURE — 93000 ELECTROCARDIOGRAM COMPLETE: CPT

## 2021-12-06 PROCEDURE — 99215 OFFICE O/P EST HI 40 MIN: CPT

## 2021-12-06 NOTE — HISTORY OF PRESENT ILLNESS
[FreeTextEntry1] : Denies CP, but chest pressure, non-exertional, usually when "having a lot to do"\par Goes up her steep driveway "at ease"\par \par She walks 3-4 times a week, about a mile -. no CP/pressure\par \par No SOB\par No palpitations\par \par Some ALVIN around thanksgiving -. also red wine, coke do it \par \par She doesn't sleep well always

## 2021-12-06 NOTE — REASON FOR VISIT
[Hyperlipidemia] : hyperlipidemia [Hypertension] : hypertension [Coronary Artery Disease] : coronary artery disease [FreeTextEntry3] : Dr Ayala

## 2021-12-09 ENCOUNTER — APPOINTMENT (OUTPATIENT)
Dept: NEPHROLOGY | Facility: CLINIC | Age: 86
End: 2021-12-09
Payer: MEDICARE

## 2021-12-09 ENCOUNTER — RESULT REVIEW (OUTPATIENT)
Age: 86
End: 2021-12-09

## 2021-12-09 VITALS
DIASTOLIC BLOOD PRESSURE: 60 MMHG | TEMPERATURE: 94.5 F | BODY MASS INDEX: 23.05 KG/M2 | HEART RATE: 76 BPM | SYSTOLIC BLOOD PRESSURE: 120 MMHG | OXYGEN SATURATION: 95 % | WEIGHT: 126 LBS

## 2021-12-09 PROCEDURE — 99212 OFFICE O/P EST SF 10 MIN: CPT

## 2021-12-09 NOTE — ASSESSMENT
[FreeTextEntry1] : Pleasant 85 yo female with hx of previously poorly controlled HTN and chronic hyponatremia \par \par Hyponatremia\par  - both SNa and BP markedly improved  on nifedipine and metoprolol, guanfacin. Her sodium is also improved (140's).\par \par Back Pain\par - off pain meds besides tylenol \par \par \par Edema\par - sec to CCB, vein harvesting, debilitation/immobility, stasis - improved with exercise and weight loss\par - no longer using support stockings; rec resuming stocking \par \par HTN\par -bp wnl tolerating reduced dose of metoprolol of 50mg ( was tapered from 125mg to 100mg and bp stable at this time)\par \par Pulm HTN/\par * found to have mod TR, mild PA HTN, mild MR, suspect prim TR ( vs sec to pulm HTN) \par -? if she may benefit from tricuspid clip

## 2021-12-09 NOTE — HISTORY OF PRESENT ILLNESS
[FreeTextEntry1] : 87 yo female initially referred by Dr. 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 \par - BP remains "best in 20 years"\par - still c/o some le edema more so L leg ( side she had sciatica and weakness along with muscle atrophy, not to mention vein harvesting when she had CABG),exacerbated by combination of guanfacin, nifedipine/CCB however is tolerating it and using compression stockings - \par - SNa 140 (8/2020), had been fluctuating between 126 and 138 since 2005 (126 in 2005, 138 4/2018, 127 8/2018),\par - serum sodium very low during hospitalization in 10/2017 at 258, urine osm also low at that time 350 as was urine sodium 27 suggesting lack of solute intake with ongoing hypotonic fluid intake\par \par - has longstanding hx of poorly controlled htn and is taking guanfacin ( alpha 2 adrenergic), valsartan, metoprolol succ er 100mg and metoprolol 25mg \par \par - recently had ECHO, interestingly found to have mod TR on ECHO - mild pul HTN\par \par Since last visit describes severe episode of le swelling, severe\par - was on her feet for prolonged periods of time\par - might be a manifestation of progression underlying TR\par \par \par \par

## 2022-01-05 ENCOUNTER — APPOINTMENT (OUTPATIENT)
Dept: ENDOCRINOLOGY | Facility: CLINIC | Age: 87
End: 2022-01-05
Payer: MEDICARE

## 2022-01-05 PROCEDURE — 99214 OFFICE O/P EST MOD 30 MIN: CPT | Mod: 95
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
## 2022-01-10 ENCOUNTER — RX RENEWAL (OUTPATIENT)
Age: 87
End: 2022-01-10

## 2022-02-15 NOTE — DISCUSSION/SUMMARY
[FreeTextEntry1] : 1.  CAD, multivessel -> CABG\par Last cath in 6/16 showed patent KASSY -> LAD, occluded SVGs to D1, OM, RPDA -. 3 Xience stents to ostial , prox and mRCA\par Stable since.  No evidence of ACS\par Atypical symptom sin setting of stress\par Rec:\par Same management\par \par 2.  HTN, labile and hard to control, especially in setting of various med intolerance\par Addressing her RADHA didn't help\par Admission to [place] on 10/10/1 with HAs/N/V -> found with P 239/109 -.>eventually d/jennifer on [facility] 10 -. d/jennifer since due to ALVIN\par BPs tend to be higher here\par 8/18, patient started seeing Dr Zapata because of hyponatremia. He noticed that she had been hyponatremic on and off for years. He recommended avoiding ARC/ACE inhibitor as that will exacerbate her hyponatremia (due to vasoconstriction of the distal renal vessel). They admitted her overnight with Na 127 which went up to 129. He recommended 2 g sodium tablets and nifedipine 62 to replace the valsartan.\par Blood pressures overall have been acceptable\par At last vivit, she reported swelling of her lower extremity which may be from a combination of factors. Nifedipine may play a part as she had swollen legs from Norvasc. She also had been liberal with her salt intake. Lastly, she has not been as active because of back pain from cyst.\par \par Echo in 5/18 showed nl EF\par We discussed the issues at length.  I didn't recommend any diuretics, especially in light of her allergies/intolerance to various drugs. In the past she did not tolerate HCTZ or Lasix.\par I also didn't want to stop her nifedipine given good blood pressure response\par I recommended watching salt intake, keeping legs up when able and using compression stockings\par \par Today, she is better, though still with some edema\par Rec:\par same management for now\par Call if worsening edema\par \par 3.  Edema\par See above discussion\par Likely from stasis. She did  have some coarse breath sounds on exam. She had been battling allergies. Echo in 5/18 showed normal EF\par I sent her for a CXR which was unremarkable\par Rec:\par As above\par \par 4.  Hyperlipidemia, intolerant of various statin\par Lipids on 10/1/17 showed chol 163, trigl 126, HDL 53, LDL 85 (on simvastatin 40) -> admission in 10/17 with HA/N/V -> thought the higher zocor at 40 may have played a part\par Used to tolerate zocor 20 -> back on it,  leg cramps\par Now on crestor 5 daily -> tolerating so far\par Lipid son 12/5/18 showed chol 171, trigl 136, HDL 70, LDL 74\par Rec:\par Cont crestor 5\par Diet, exercise as tolerated
[FreeTextEntry1] : 1.  CAD, multivessel -> CABG\par Last cath in 6/16 showed patent KASSY -> LAD, occluded SVGs to D1, OM, RPDA -. 3 Xience stents to ostial , prox and mRCA\par Stable since.  No evidence of ACS\par Atypical symptom sin setting of stress\par Rec:\par Same management\par \par 2.  HTN, labile and hard to control, especially in setting of various med intolerance\par Addressing her RADHA didn't help\par Admission to [place] on 10/10/1 with HAs/N/V -> found with P 239/109 -.>eventually d/jennifer on [facility] 10 -. d/jennifer since due to ALVIN\par BPs tend to be higher here\par 8/18, patient started seeing Dr Zapata because of hyponatremia. He noticed that she had been hyponatremic on and off for years. He recommended avoiding ARC/ACE inhibitor as that will exacerbate her hyponatremia (due to vasoconstriction of the distal renal vessel). They admitted her overnight with Na 127 which went up to 129. He recommended 2 g sodium tablets and nifedipine 62 to replace the valsartan.\par Blood pressures overall have been acceptable\par At last vivit, she reported swelling of her lower extremity which may be from a combination of factors. Nifedipine may play a part as she had swollen legs from Norvasc. She also had been liberal with her salt intake. Lastly, she has not been as active because of back pain from cyst.\par \par Echo in 5/18 showed nl EF\par We discussed the issues at length.  I didn't recommend any diuretics, especially in light of her allergies/intolerance to various drugs. In the past she did not tolerate HCTZ or Lasix.\par I also didn't want to stop her nifedipine given good blood pressure response\par I recommended watching salt intake, keeping legs up when able and using compression stockings\par \par Today, she is better, though still with some edema\par Rec:\par same management for now\par Call if worsening edema\par \par 3.  Edema\par See above discussion\par Likely from stasis. She did  have some coarse breath sounds on exam. She had been battling allergies. Echo in 5/18 showed normal EF\par I sent her for a CXR which was unremarkable\par Rec:\par As above\par \par 4.  Hyperlipidemia, intolerant of various statin\par Lipids on 10/1/17 showed chol 163, trigl 126, HDL 53, LDL 85 (on simvastatin 40) -> admission in 10/17 with HA/N/V -> thought the higher zocor at 40 may have played a part\par Used to tolerate zocor 20 -> back on it,  leg cramps\par Now on crestor 5 daily -> tolerating so far\par Lipid son 12/5/18 showed chol 171, trigl 136, HDL 70, LDL 74\par Rec:\par Cont crestor 5\par Diet, exercise as tolerated
[FreeTextEntry1] : 1.  CAD, multivessel -> CABG\par Last cath in 6/16 showed patent KASSY -> LAD, occluded SVGs to D1, OM, RPDA -. 3 Xience stents to ostial , prox and mRCA\par Stable since.  No evidence of ACS\par Atypical symptom sin setting of stress\par Rec:\par Same management\par \par 2.  HTN, labile and hard to control, especially in setting of various med intolerance\par Addressing her RADHA didn't help\par Admission to [place] on 10/10/1 with HAs/N/V -> found with P 239/109 -.>eventually d/jennifer on [facility] 10 -. d/jennifer since due to ALVIN\par BPs tend to be higher here\par 8/18, patient started seeing Dr Zapata because of hyponatremia. He noticed that she had been hyponatremic on and off for years. He recommended avoiding ARC/ACE inhibitor as that will exacerbate her hyponatremia (due to vasoconstriction of the distal renal vessel). They admitted her overnight with Na 127 which went up to 129. He recommended 2 g sodium tablets and nifedipine 62 to replace the valsartan.\par Blood pressures overall have been acceptable\par At last vivit, she reported swelling of her lower extremity which may be from a combination of factors. Nifedipine may play a part as she had swollen legs from Norvasc. She also had been liberal with her salt intake. Lastly, she has not been as active because of back pain from cyst.\par \par Echo in 5/18 showed nl EF\par We discussed the issues at length.  I didn't recommend any diuretics, especially in light of her allergies/intolerance to various drugs. In the past she did not tolerate HCTZ or Lasix.\par I also didn't want to stop her nifedipine given good blood pressure response\par I recommended watching salt intake, keeping legs up when able and using compression stockings\par \par Today, she is better, though still with some edema\par Rec:\par same management for now\par Call if worsening edema\par \par 3.  Edema\par See above discussion\par Likely from stasis. She did  have some coarse breath sounds on exam. She had been battling allergies. Echo in 5/18 showed normal EF\par I sent her for a CXR which was unremarkable\par Rec:\par As above\par \par 4.  Hyperlipidemia, intolerant of various statin\par Lipids on 10/1/17 showed chol 163, trigl 126, HDL 53, LDL 85 (on simvastatin 40) -> admission in 10/17 with HA/N/V -> thought the higher zocor at 40 may have played a part\par Used to tolerate zocor 20 -> back on it,  leg cramps\par Now on crestor 5 daily -> tolerating so far\par Lipid son 12/5/18 showed chol 171, trigl 136, HDL 70, LDL 74\par Rec:\par Cont crestor 5\par Diet, exercise as tolerated
ICU

## 2022-02-19 ENCOUNTER — RX RENEWAL (OUTPATIENT)
Age: 87
End: 2022-02-19

## 2022-03-10 ENCOUNTER — APPOINTMENT (OUTPATIENT)
Dept: NEPHROLOGY | Facility: CLINIC | Age: 87
End: 2022-03-10
Payer: MEDICARE

## 2022-03-10 VITALS
SYSTOLIC BLOOD PRESSURE: 130 MMHG | HEART RATE: 76 BPM | TEMPERATURE: 96.3 F | WEIGHT: 126 LBS | HEIGHT: 62 IN | OXYGEN SATURATION: 98 % | BODY MASS INDEX: 23.19 KG/M2 | DIASTOLIC BLOOD PRESSURE: 60 MMHG

## 2022-03-10 PROCEDURE — 99214 OFFICE O/P EST MOD 30 MIN: CPT

## 2022-04-04 ENCOUNTER — RX RENEWAL (OUTPATIENT)
Age: 87
End: 2022-04-04

## 2022-04-14 ENCOUNTER — APPOINTMENT (OUTPATIENT)
Dept: OBGYN | Facility: CLINIC | Age: 87
End: 2022-04-14
Payer: MEDICARE

## 2022-04-14 VITALS
SYSTOLIC BLOOD PRESSURE: 130 MMHG | HEIGHT: 62 IN | DIASTOLIC BLOOD PRESSURE: 70 MMHG | WEIGHT: 126 LBS | BODY MASS INDEX: 23.19 KG/M2

## 2022-04-14 PROCEDURE — G0101: CPT

## 2022-04-14 PROCEDURE — 57160 INSERT PESSARY/OTHER DEVICE: CPT

## 2022-04-18 ENCOUNTER — NON-APPOINTMENT (OUTPATIENT)
Age: 87
End: 2022-04-18

## 2022-04-18 LAB
25(OH)D3 SERPL-MCNC: 29.8 NG/ML
ALBUMIN SERPL ELPH-MCNC: 4.7 G/DL
ALP BLD-CCNC: 57 U/L
ALT SERPL-CCNC: 8 U/L
ANION GAP SERPL CALC-SCNC: 14 MMOL/L
AST SERPL-CCNC: 28 U/L
BASOPHILS # BLD AUTO: 0.04 K/UL
BASOPHILS NFR BLD AUTO: 0.6 %
BILIRUB DIRECT SERPL-MCNC: 0.1 MG/DL
BILIRUB INDIRECT SERPL-MCNC: 0.4 MG/DL
BILIRUB SERPL-MCNC: 0.5 MG/DL
BUN SERPL-MCNC: 12 MG/DL
CALCIUM SERPL-MCNC: 9.3 MG/DL
CHLORIDE SERPL-SCNC: 100 MMOL/L
CO2 SERPL-SCNC: 26 MMOL/L
CREAT SERPL-MCNC: 0.77 MG/DL
EOSINOPHIL # BLD AUTO: 0.26 K/UL
EOSINOPHIL NFR BLD AUTO: 4 %
ESTIMATED AVERAGE GLUCOSE: 120 MG/DL
FOLATE SERPL-MCNC: >20 NG/ML
GLUCOSE SERPL-MCNC: 96 MG/DL
HBA1C MFR BLD HPLC: 5.8 %
HCT VFR BLD CALC: 41.7 %
HGB BLD-MCNC: 13.3 G/DL
IMM GRANULOCYTES NFR BLD AUTO: 0.5 %
LYMPHOCYTES # BLD AUTO: 2.17 K/UL
LYMPHOCYTES NFR BLD AUTO: 33.7 %
MAN DIFF?: NORMAL
MCHC RBC-ENTMCNC: 31 PG
MCHC RBC-ENTMCNC: 31.9 GM/DL
MCV RBC AUTO: 97.2 FL
MONOCYTES # BLD AUTO: 0.55 K/UL
MONOCYTES NFR BLD AUTO: 8.5 %
NEUTROPHILS # BLD AUTO: 3.39 K/UL
NEUTROPHILS NFR BLD AUTO: 52.7 %
PLATELET # BLD AUTO: 275 K/UL
POTASSIUM SERPL-SCNC: 4.2 MMOL/L
PROT SERPL-MCNC: 7.7 G/DL
RBC # BLD: 4.29 M/UL
RBC # FLD: 13.5 %
SODIUM SERPL-SCNC: 140 MMOL/L
TSH SERPL-ACNC: 3.95 UIU/ML
VIT B12 SERPL-MCNC: 482 PG/ML
WBC # FLD AUTO: 6.44 K/UL

## 2022-05-12 ENCOUNTER — APPOINTMENT (OUTPATIENT)
Dept: OBGYN | Facility: CLINIC | Age: 87
End: 2022-05-12
Payer: MEDICARE

## 2022-05-12 PROCEDURE — 57160 INSERT PESSARY/OTHER DEVICE: CPT

## 2022-06-09 ENCOUNTER — RESULT REVIEW (OUTPATIENT)
Age: 87
End: 2022-06-09

## 2022-06-09 ENCOUNTER — APPOINTMENT (OUTPATIENT)
Dept: NEPHROLOGY | Facility: CLINIC | Age: 87
End: 2022-06-09
Payer: MEDICARE

## 2022-06-09 VITALS
BODY MASS INDEX: 22.86 KG/M2 | SYSTOLIC BLOOD PRESSURE: 140 MMHG | TEMPERATURE: 97.2 F | DIASTOLIC BLOOD PRESSURE: 70 MMHG | OXYGEN SATURATION: 96 % | WEIGHT: 125 LBS | HEART RATE: 80 BPM

## 2022-06-09 PROCEDURE — 36415 COLL VENOUS BLD VENIPUNCTURE: CPT

## 2022-06-09 PROCEDURE — 99214 OFFICE O/P EST MOD 30 MIN: CPT | Mod: 25

## 2022-06-09 NOTE — HISTORY OF PRESENT ILLNESS
[FreeTextEntry1] : 88 yo female initially referred by Dr. 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 \par - BP remains "best in 20 years"\par - still c/o some le edema more so L leg ( side she had sciatica and weakness along with muscle atrophy, not to mention vein harvesting when she had CABG),exacerbated by combination of guanfacin, nifedipine/CCB however is tolerating it and using compression stockings - \par - SNa 140 ( since 8/2020), had been fluctuating between 126 and 138 since 2005 (126 in 2005, 138 4/2018, 127 8/2018),\par - serum sodium very low during hospitalization in 10/2017 at 258, urine osm also low at that time 350 as was urine sodium 27 suggesting lack of solute intake with ongoing hypotonic fluid intake\par \par - has longstanding hx of poorly controlled htn and is taking guanfacin ( alpha 2 adrenergic), valsartan, metoprolol succ er 100mg and metoprolol 25mg \par \par - recently had ECHO, interestingly found to have 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

## 2022-07-24 ENCOUNTER — TRANSCRIPTION ENCOUNTER (OUTPATIENT)
Age: 87
End: 2022-07-24

## 2022-07-27 ENCOUNTER — APPOINTMENT (OUTPATIENT)
Dept: GASTROENTEROLOGY | Facility: CLINIC | Age: 87
End: 2022-07-27

## 2022-07-27 PROCEDURE — 99443: CPT | Mod: 95

## 2022-07-27 NOTE — HISTORY OF PRESENT ILLNESS
[FreeTextEntry1] : this is a semi emergent telephonic visit, audio only\par had an episode five days ago\par \par had cold sensation, shaking chills,\par had to go in a warming device\par and seemed to be hypothermic\par \par this was in [facility] ed\par the diagnosis was Urinary Tract Infection that caused sepsis\par she was given iv antibiotics, admitted for forty eight hours, and received iv antibiotics and iv saline\par \par they had done blood cultures, not available, but the suspicion was E coli\par ct not done\par but ultrasound of kidneys was performed\par no cultures were available upon the time of discharge.\par \par the antibiotics \par cefuroxime 50 mgm bid was the oral discharge medication\par might have received iv rocephin while in the hospital\par \par now five days since this began, she feels pretty well\par overall, she is doing well\par \par stayed on yogurt and florastor..since being started on the antibiotics\par \par

## 2022-07-27 NOTE — ASSESSMENT
[FreeTextEntry1] : 1.  recent gu sepsis\par 2.  has appt to see [place] for follow up\par 3.  on tuesday august 2nd\par \par 4.  has been advised to complete cefuroxime through Friday,\par \par \par no fever, no chills currently\par eating ok\par bowel movements are normal\par she is worried because she has experienced c difficile in the past\par \par she had c diff before and after her colon resection for diverticulitis\par \par rx with vancomycin\par \par BY THE WAY, PATIENT HAD EXPERIENCED ONGOING URINARY SYMPTOMS DUE TO A UTERINE PROLAPSE AND SHE HAD LOWER URINARY TRACT SYMPTOMS\par SHE HAD EVEN DONE PELVIC FLOOR THERAPY BEFORE THIS OCCURRENCE, BECAUSE OF THE URINARY TRACT INFECTION\par \par More than 50% of the face to face time was devoted to counseling and /or coordination of care.  THis coordination of care may have included reviewing other medical notes and reports, and communicating with other health professionals\par \par

## 2022-08-01 ENCOUNTER — APPOINTMENT (OUTPATIENT)
Dept: ENDOCRINOLOGY | Facility: CLINIC | Age: 87
End: 2022-08-01

## 2022-08-01 ENCOUNTER — NON-APPOINTMENT (OUTPATIENT)
Age: 87
End: 2022-08-01

## 2022-08-02 ENCOUNTER — LABORATORY RESULT (OUTPATIENT)
Age: 87
End: 2022-08-02

## 2022-08-02 ENCOUNTER — APPOINTMENT (OUTPATIENT)
Dept: GASTROENTEROLOGY | Facility: CLINIC | Age: 87
End: 2022-08-02

## 2022-08-02 VITALS
WEIGHT: 125 LBS | HEART RATE: 74 BPM | TEMPERATURE: 98.7 F | SYSTOLIC BLOOD PRESSURE: 130 MMHG | DIASTOLIC BLOOD PRESSURE: 60 MMHG | OXYGEN SATURATION: 98 % | HEIGHT: 62 IN | BODY MASS INDEX: 23 KG/M2

## 2022-08-02 PROCEDURE — 99212 OFFICE O/P EST SF 10 MIN: CPT

## 2022-08-02 NOTE — HISTORY OF PRESENT ILLNESS
[FreeTextEntry1] : Ms. SANDEE BARAHONA is a 87 year old female with a PMH of \par \par \par Recently completed course of cefuroxime abx for UTI. \par She was hospitalized at [facility] for urosepsis. \par Completed course on Friday.\par Denies dysuria, frequency or hematuria.

## 2022-08-03 ENCOUNTER — LABORATORY RESULT (OUTPATIENT)
Age: 87
End: 2022-08-03

## 2022-08-03 LAB
APPEARANCE: ABNORMAL
BILIRUBIN URINE: NEGATIVE
BLOOD URINE: NEGATIVE
COLOR: YELLOW
GLUCOSE QUALITATIVE U: NEGATIVE
KETONES URINE: NEGATIVE
LEUKOCYTE ESTERASE URINE: ABNORMAL
NITRITE URINE: NEGATIVE
PH URINE: 6
PROTEIN URINE: NORMAL
SPECIFIC GRAVITY URINE: 1.02
UROBILINOGEN URINE: NORMAL

## 2022-08-04 ENCOUNTER — APPOINTMENT (OUTPATIENT)
Dept: OBGYN | Facility: CLINIC | Age: 87
End: 2022-08-04

## 2022-08-04 ENCOUNTER — RX RENEWAL (OUTPATIENT)
Age: 87
End: 2022-08-04

## 2022-08-04 VITALS
WEIGHT: 125 LBS | DIASTOLIC BLOOD PRESSURE: 70 MMHG | HEIGHT: 62 IN | SYSTOLIC BLOOD PRESSURE: 120 MMHG | BODY MASS INDEX: 23 KG/M2

## 2022-08-04 PROCEDURE — 57160 INSERT PESSARY/OTHER DEVICE: CPT

## 2022-08-10 ENCOUNTER — APPOINTMENT (OUTPATIENT)
Dept: NEPHROLOGY | Facility: CLINIC | Age: 87
End: 2022-08-10

## 2022-08-10 VITALS
SYSTOLIC BLOOD PRESSURE: 130 MMHG | HEIGHT: 62 IN | HEART RATE: 78 BPM | OXYGEN SATURATION: 98 % | BODY MASS INDEX: 23 KG/M2 | TEMPERATURE: 96 F | DIASTOLIC BLOOD PRESSURE: 70 MMHG | WEIGHT: 125 LBS

## 2022-08-10 PROCEDURE — 99214 OFFICE O/P EST MOD 30 MIN: CPT

## 2022-08-10 NOTE — ASSESSMENT
[FreeTextEntry1] : Pleasant 87 yo female with hx of previously poorly controlled HTN and chronic hyponatremia \par \par Hyponatremia\par  - both SNa and BP markedly improved  on nifedipine and metoprolol, guanfacin. Her sodium is also improved (140's).\par  \par Back pain\par -Her back pain is also improved \par - off pain meds besides tylenol \par - edema sec to CCB, vein harvesting, debilitation/immobility, stasis - improved with exercise and weight loss\par - no longer using support stockings; \par \par HTN\par -bp wnl tolerating reduced dose of metoprolol of 50mg ( was tapered from 125mg to 100mg and bp stable at this time)\par \par Valvular Heart Disease\par - found to have mod TR, mild PA HTN, mild MR, suspect prim TR ( vs sec to pulm HTN) -? if she may benefit from tricuspid clip\par \par hPTH\par - check D2 and D3 levels\par - check PTH\par - will accelerate bone loss (pt on prolia)\par \par Hospital records reviewed\par

## 2022-08-10 NOTE — HISTORY OF PRESENT ILLNESS
[FreeTextEntry1] : 86 yo female initially referred by Dr. 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 \par - BP remains "best in 20 years"\par - still c/o some le edema more so L leg ( side she had sciatica and weakness along with muscle atrophy, not to mention vein harvesting when she had CABG),exacerbated by combination of guanfacin, nifedipine/CCB however is tolerating it and using compression stockings - \par - SNa 140 ( since 8/2020), had been fluctuating between 126 and 138 since 2005 (126 in 2005, 138 4/2018, 127 8/2018),\par - serum sodium very low during hospitalization in 10/2017 at 258, urine osm also low at that time 350 as was urine sodium 27 suggesting lack of solute intake with ongoing hypotonic fluid intake\par \par - has longstanding hx of poorly controlled htn and is taking guanfacin ( alpha 2 adrenergic), valsartan, metoprolol succ er 100mg and metoprolol 25mg \par \par - had ECHO, interestingly found to have 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 E. faecalis - started on cipro by OB\par \par \par \par \par \par

## 2022-08-10 NOTE — HISTORY OF PRESENT ILLNESS
[TextBox_4] : 5/12/22 insertion of a #5 ring pessary with support for management of patient's uterine prolapse, cystocele and rectocele with the cervix protruding about 4 cm past the introitus. Dr. Davie erwin note indicates that the patient was admitted for ~3 days to [facility] with urosepsis. Discharged home on cefuroxime 50 mg b.i.d. Pt was rx'd w/ a warming blanket. Has completed the course of abx. Pessary has remained in place. The patient was told at the hospital that a pessary might have been contributory to the infection. I pointed out that she is more likely to have urinary retention and resultant UTI due to pelvic organ prolapse causing a tight curve on the urethra making complete emptying of the bladder difficult. With the pessary in place, there should be less retention. Patient volunteered that after the pessary was inserted on 5/12/22 she noticed increased urinary incontinence which is consistent with the above mentioned mechanical effect of the pessary in straightening the urethra. \par U/A, C&S sent on 8/2/22 - large leuk esterase; 60 WBC's/hpf; 5 RBC's/hpf.

## 2022-08-11 ENCOUNTER — NON-APPOINTMENT (OUTPATIENT)
Age: 87
End: 2022-08-11

## 2022-08-11 DIAGNOSIS — M77.30 CALCANEAL SPUR, UNSPECIFIED FOOT: ICD-10-CM

## 2022-08-11 DIAGNOSIS — M47.817 SPONDYLOSIS W/OUT MYELOPATHY OR RADICULOPATHY, LUMBOSACRAL REGION: ICD-10-CM

## 2022-08-11 DIAGNOSIS — M72.2 PLANTAR FASCIAL FIBROMATOSIS: ICD-10-CM

## 2022-08-11 DIAGNOSIS — E53.8 DEFICIENCY OF OTHER SPECIFIED B GROUP VITAMINS: ICD-10-CM

## 2022-08-11 DIAGNOSIS — E55.9 VITAMIN D DEFICIENCY, UNSPECIFIED: ICD-10-CM

## 2022-08-11 DIAGNOSIS — B96.89 ACUTE TUBULO-INTERSTITIAL NEPHRITIS: ICD-10-CM

## 2022-08-11 DIAGNOSIS — M71.38 OTHER BURSAL CYST, OTHER SITE: ICD-10-CM

## 2022-08-11 DIAGNOSIS — N10 ACUTE TUBULO-INTERSTITIAL NEPHRITIS: ICD-10-CM

## 2022-08-12 ENCOUNTER — APPOINTMENT (OUTPATIENT)
Dept: CARDIOLOGY | Facility: CLINIC | Age: 87
End: 2022-08-12

## 2022-08-12 ENCOUNTER — NON-APPOINTMENT (OUTPATIENT)
Age: 87
End: 2022-08-12

## 2022-08-12 VITALS
DIASTOLIC BLOOD PRESSURE: 80 MMHG | BODY MASS INDEX: 23 KG/M2 | WEIGHT: 125 LBS | HEIGHT: 62 IN | SYSTOLIC BLOOD PRESSURE: 162 MMHG

## 2022-08-12 PROCEDURE — 99215 OFFICE O/P EST HI 40 MIN: CPT

## 2022-08-12 PROCEDURE — G2212 PROLONG OUTPT/OFFICE VIS: CPT

## 2022-08-12 PROCEDURE — 93000 ELECTROCARDIOGRAM COMPLETE: CPT

## 2022-08-12 PROCEDURE — 36415 COLL VENOUS BLD VENIPUNCTURE: CPT

## 2022-08-12 RX ORDER — SUCRALFATE 1 G/10ML
1 SUSPENSION ORAL
Qty: 1800 | Refills: 0 | Status: DISCONTINUED | COMMUNITY
Start: 2018-12-12 | End: 2022-08-12

## 2022-08-12 RX ORDER — PSYLLIUM HUSK 0.4 G
CAPSULE ORAL
Refills: 0 | Status: DISCONTINUED | COMMUNITY
End: 2022-08-12

## 2022-08-12 NOTE — CARDIOLOGY SUMMARY
[de-identified] : 8/12/22-nsr low voltage [de-identified] : 6/2021-mildly dilated and hypokinetic rv, pasp 41mmhg, mild as, mild mr, moderate tr, ef 70%, DD [de-identified] : nuclear 2020-normal [de-identified] : 2015- stents to native mRCA, all grafts closed but kilo to lad [de-identified] : 2013- kilo-lad, svg-om, rpda,rca, d1

## 2022-08-12 NOTE — HISTORY OF PRESENT ILLNESS
[FreeTextEntry1] : Ms. Figueroa has previously been under the care of Dr. Carter.  She had CABG 2013.Recath 2015 patent KASSY to LAD, occluded grafts to OM, PDA,RCA, treated with 3 RCA stents. There was also RADHA with a renal stent, a h/o TIA, central retinal artery occlusion and labile HTN with numerous medication intolerances.\par \par She was admitted to [facility] July 22 2022 with urosepsis.\par \par She is treated for chronic chest discomfort- this is sporadic. Was previously in the right chest. The most recent was yesterday pinpoint over the left pectoral area and right pectoral area, "half present" while moving around relieved spontaneously after 20 minutes. She denies dyspnea at rest ( has with inclines), no palpitations or syncope.

## 2022-08-15 ENCOUNTER — NON-APPOINTMENT (OUTPATIENT)
Age: 87
End: 2022-08-15

## 2022-08-15 ENCOUNTER — APPOINTMENT (OUTPATIENT)
Dept: CARDIOLOGY | Facility: CLINIC | Age: 87
End: 2022-08-15

## 2022-08-15 PROCEDURE — 36415 COLL VENOUS BLD VENIPUNCTURE: CPT

## 2022-08-16 ENCOUNTER — TRANSCRIPTION ENCOUNTER (OUTPATIENT)
Age: 87
End: 2022-08-16

## 2022-08-16 LAB
ALBUMIN SERPL ELPH-MCNC: 4.8 G/DL
ALP BLD-CCNC: 55 U/L
ALT SERPL-CCNC: 11 U/L
ANION GAP SERPL CALC-SCNC: 21 MMOL/L
AST SERPL-CCNC: 32 U/L
BILIRUB SERPL-MCNC: 0.7 MG/DL
BUN SERPL-MCNC: 18 MG/DL
CALCIUM SERPL-MCNC: 9.8 MG/DL
CHLORIDE SERPL-SCNC: 97 MMOL/L
CHOLEST SERPL-MCNC: 162 MG/DL
CO2 SERPL-SCNC: 18 MMOL/L
CREAT SERPL-MCNC: 0.74 MG/DL
CRP SERPL HS-MCNC: 0.99 MG/L
EGFR: 78 ML/MIN/1.73M2
GLUCOSE SERPL-MCNC: 93 MG/DL
HDLC SERPL-MCNC: 65 MG/DL
LDLC SERPL CALC-MCNC: 65 MG/DL
NONHDLC SERPL-MCNC: 97 MG/DL
POTASSIUM SERPL-SCNC: 4.5 MMOL/L
PROT SERPL-MCNC: 7.7 G/DL
SODIUM SERPL-SCNC: 136 MMOL/L
TRIGL SERPL-MCNC: 160 MG/DL

## 2022-08-17 ENCOUNTER — TRANSCRIPTION ENCOUNTER (OUTPATIENT)
Age: 87
End: 2022-08-17

## 2022-08-17 LAB — APO LP(A) SERPL-MCNC: 210.6 NMOL/L

## 2022-08-18 ENCOUNTER — LABORATORY RESULT (OUTPATIENT)
Age: 87
End: 2022-08-18

## 2022-08-25 LAB
APPEARANCE: CLEAR
BILIRUBIN URINE: NEGATIVE
BLOOD URINE: NEGATIVE
COLOR: NORMAL
GLUCOSE QUALITATIVE U: NEGATIVE
KETONES URINE: NEGATIVE
LEUKOCYTE ESTERASE URINE: ABNORMAL
NITRITE URINE: NEGATIVE
PH URINE: 6.5
PROTEIN URINE: NEGATIVE
SPECIFIC GRAVITY URINE: 1.01
UROBILINOGEN URINE: NORMAL

## 2022-09-10 ENCOUNTER — RESULT CHARGE (OUTPATIENT)
Age: 87
End: 2022-09-10

## 2022-09-12 ENCOUNTER — LABORATORY RESULT (OUTPATIENT)
Age: 87
End: 2022-09-12

## 2022-09-12 ENCOUNTER — APPOINTMENT (OUTPATIENT)
Dept: GASTROENTEROLOGY | Facility: CLINIC | Age: 87
End: 2022-09-12

## 2022-09-12 PROCEDURE — 99443: CPT | Mod: 95

## 2022-09-12 NOTE — ASSESSMENT
[FreeTextEntry1] : Marium has had recurrent urinary tract infections but they are of recent onset\par \par 2.  This is far more than asymptomatic bacteriuria, as Marium is aware of when her urine is getting infected, and she has had 1 hospitalization for gram-negative sepsis, and another trip to the [place] emergency room on an emergency basis\par \par 3.  She did have the diagnosis of a uterine prolapse, and perhaps around 3 months ago Dr. Mendelowitz placed a pessary\par \par 4.  We have not yet evaluated her for urinary retention, high postvoiding residual, and even for stone disease at least from available studies that I have–all of these things probably need to be checked\par \par Plan\par 1.  I do have a urine culture pending at this point that was done today\par 2.  At the same time I am referring the patient for Urogynecologic consultation with dr Ian Singer..\par \par More than 50% of the face to face time was devoted to counseling and /or coordination of care.  THis coordination of care may have included reviewing other medical notes and reports, and communicating with other health professionals\par

## 2022-09-23 ENCOUNTER — APPOINTMENT (OUTPATIENT)
Dept: CARDIOLOGY | Facility: CLINIC | Age: 87
End: 2022-09-23

## 2022-09-23 ENCOUNTER — TRANSCRIPTION ENCOUNTER (OUTPATIENT)
Age: 87
End: 2022-09-23

## 2022-09-23 PROCEDURE — 93306 TTE W/DOPPLER COMPLETE: CPT

## 2022-09-26 ENCOUNTER — RESULT CHARGE (OUTPATIENT)
Age: 87
End: 2022-09-26

## 2022-09-28 ENCOUNTER — LABORATORY RESULT (OUTPATIENT)
Age: 87
End: 2022-09-28

## 2022-09-29 LAB
APPEARANCE: CLEAR
BILIRUBIN URINE: NEGATIVE
BLOOD URINE: NEGATIVE
COLOR: YELLOW
GLUCOSE QUALITATIVE U: NEGATIVE
KETONES URINE: NEGATIVE
LEUKOCYTE ESTERASE URINE: NEGATIVE
NITRITE URINE: NEGATIVE
PH URINE: 7
PROTEIN URINE: ABNORMAL
SPECIFIC GRAVITY URINE: 1.02
UROBILINOGEN URINE: NORMAL

## 2022-09-30 ENCOUNTER — APPOINTMENT (OUTPATIENT)
Dept: ENDOCRINOLOGY | Facility: CLINIC | Age: 87
End: 2022-09-30

## 2022-09-30 PROCEDURE — 99214 OFFICE O/P EST MOD 30 MIN: CPT
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
## 2022-10-02 LAB — BACTERIA UR CULT: ABNORMAL

## 2022-10-14 ENCOUNTER — RX RENEWAL (OUTPATIENT)
Age: 87
End: 2022-10-14

## 2022-10-17 ENCOUNTER — RESULT REVIEW (OUTPATIENT)
Age: 87
End: 2022-10-17

## 2022-10-18 ENCOUNTER — TRANSCRIPTION ENCOUNTER (OUTPATIENT)
Age: 87
End: 2022-10-18

## 2022-10-31 ENCOUNTER — RX RENEWAL (OUTPATIENT)
Age: 87
End: 2022-10-31

## 2022-11-07 ENCOUNTER — APPOINTMENT (OUTPATIENT)
Dept: OBGYN | Facility: CLINIC | Age: 87
End: 2022-11-07

## 2022-11-07 VITALS
DIASTOLIC BLOOD PRESSURE: 70 MMHG | BODY MASS INDEX: 22.63 KG/M2 | HEIGHT: 62 IN | WEIGHT: 123 LBS | SYSTOLIC BLOOD PRESSURE: 138 MMHG

## 2022-11-07 PROCEDURE — 57160 INSERT PESSARY/OTHER DEVICE: CPT

## 2022-11-07 RX ORDER — METHENAMINE HIPPURATE 1 G/1
1 TABLET ORAL
Qty: 60 | Refills: 0 | Status: ACTIVE | COMMUNITY
Start: 2022-10-31

## 2022-11-09 ENCOUNTER — RESULT REVIEW (OUTPATIENT)
Age: 87
End: 2022-11-09

## 2022-11-09 ENCOUNTER — APPOINTMENT (OUTPATIENT)
Dept: NEPHROLOGY | Facility: CLINIC | Age: 87
End: 2022-11-09

## 2022-11-09 VITALS
HEART RATE: 78 BPM | DIASTOLIC BLOOD PRESSURE: 70 MMHG | SYSTOLIC BLOOD PRESSURE: 130 MMHG | OXYGEN SATURATION: 98 % | TEMPERATURE: 95.6 F | BODY MASS INDEX: 21.25 KG/M2 | WEIGHT: 115.5 LBS | HEIGHT: 62 IN

## 2022-11-09 DIAGNOSIS — D64.9 ANEMIA, UNSPECIFIED: ICD-10-CM

## 2022-11-09 PROCEDURE — 99214 OFFICE O/P EST MOD 30 MIN: CPT

## 2022-11-09 NOTE — HISTORY OF PRESENT ILLNESS
[FreeTextEntry1] : 88 yo female initially referred by Dr. 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),exacerbated by combination of guanfacin, nifedipine/CCB however is tolerating it and using compression stockings - \par - SNa 140 ( since 8/2020), had been fluctuating between 126 and 138 since 2005 (126 in 2005, 138 4/2018, 127 8/2018),\par - serum sodium very low during hospitalization in 10/2017 at 258, urine osm also low at that time 350 as was urine sodium 27 suggesting lack of solute intake with ongoing hypotonic fluid intake\par \par - has longstanding hx of poorly controlled htn and is taking guanfacin ( alpha 2 adrenergic), valsartan, metoprolol succ er 100mg and metoprolol 25mg \par \par - had ECHO, interestingly found to have 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 Cope [facility]ro [facility]\par - started on Methenamine Hippurate, feels much better\par -  had a fallen, lost weight caring for him, \par \par 8/10/22\par - hospitalized with sepsis 2/2 UTI\par - was having symptoms took hyoscamin\par - since discharge developed another UTI. E. faecalis - started on cipro by OB\par \par \par \par \par \par

## 2022-11-09 NOTE — ASSESSMENT
[FreeTextEntry1] : Pleasant 85 yo female with hx of previously poorly controlled HTN and chronic hyponatremia \par \par Hyponatremia\par  - both SNa and BP markedly improved  on nifedipine and metoprolol, guanfacin. 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 if she may benefit from tricuspid clip, repeat ECHO with Cardiology showed only MILD TR\par - seeing cardiology\par \par hPTH\par - check D2 and D3 levels\par - check PTH\par - will accelerate bone loss (pt on prolia)\par \par Hospital records reviewed\par

## 2022-12-21 ENCOUNTER — NON-APPOINTMENT (OUTPATIENT)
Age: 87
End: 2022-12-21

## 2022-12-21 DIAGNOSIS — A41.9 SEPSIS, UNSPECIFIED ORGANISM: ICD-10-CM

## 2022-12-21 DIAGNOSIS — I25.10 ATHEROSCLEROTIC HEART DISEASE OF NATIVE CORONARY ARTERY W/OUT ANGINA PECTORIS: ICD-10-CM

## 2022-12-21 DIAGNOSIS — Z78.9 OTHER SPECIFIED HEALTH STATUS: ICD-10-CM

## 2022-12-22 ENCOUNTER — APPOINTMENT (OUTPATIENT)
Dept: CARDIOLOGY | Facility: CLINIC | Age: 87
End: 2022-12-22
Payer: MEDICARE

## 2022-12-22 ENCOUNTER — NON-APPOINTMENT (OUTPATIENT)
Age: 87
End: 2022-12-22

## 2022-12-22 VITALS
OXYGEN SATURATION: 100 % | TEMPERATURE: 98.6 F | HEIGHT: 62 IN | BODY MASS INDEX: 21.9 KG/M2 | SYSTOLIC BLOOD PRESSURE: 138 MMHG | WEIGHT: 119 LBS | HEART RATE: 96 BPM | DIASTOLIC BLOOD PRESSURE: 70 MMHG

## 2022-12-22 DIAGNOSIS — I07.1 RHEUMATIC TRICUSPID INSUFFICIENCY: ICD-10-CM

## 2022-12-22 DIAGNOSIS — I51.7 CARDIOMEGALY: ICD-10-CM

## 2022-12-22 DIAGNOSIS — I77.9 DISORDER OF ARTERIES AND ARTERIOLES, UNSPECIFIED: ICD-10-CM

## 2022-12-22 DIAGNOSIS — E78.41 ELEVATED LIPOPROTEIN(A): ICD-10-CM

## 2022-12-22 DIAGNOSIS — I27.20 PULMONARY HYPERTENSION, UNSPECIFIED: ICD-10-CM

## 2022-12-22 DIAGNOSIS — I49.3 VENTRICULAR PREMATURE DEPOLARIZATION: ICD-10-CM

## 2022-12-22 DIAGNOSIS — Z86.79 PERSONAL HISTORY OF OTHER DISEASES OF THE CIRCULATORY SYSTEM: ICD-10-CM

## 2022-12-22 DIAGNOSIS — R60.0 LOCALIZED EDEMA: ICD-10-CM

## 2022-12-22 DIAGNOSIS — Z95.5 PRESENCE OF CORONARY ANGIOPLASTY IMPLANT AND GRAFT: ICD-10-CM

## 2022-12-22 DIAGNOSIS — Z86.69 PERSONAL HISTORY OF OTHER DISEASES OF THE NERVOUS SYSTEM AND SENSE ORGANS: ICD-10-CM

## 2022-12-22 DIAGNOSIS — R07.9 CHEST PAIN, UNSPECIFIED: ICD-10-CM

## 2022-12-22 PROCEDURE — 99215 OFFICE O/P EST HI 40 MIN: CPT

## 2022-12-22 PROCEDURE — 93000 ELECTROCARDIOGRAM COMPLETE: CPT

## 2022-12-22 RX ORDER — ALENDRONATE SODIUM 35 MG/1
35 TABLET ORAL
Qty: 12 | Refills: 0 | Status: DISCONTINUED | COMMUNITY
Start: 2022-09-30 | End: 2022-12-22

## 2022-12-22 NOTE — HISTORY OF PRESENT ILLNESS
[FreeTextEntry1] : Ms. Figueroa has previously been under the care of Dr. Carter.  She had CABG 2013.Recath 2015 patent KASSY to LAD, occluded grafts to OM, PDA,RCA, treated with 3 RCA stents.(2015)\par \par  There was also RADHA with a renal stent, a h/o TIA, central retinal artery occlusion and labile HTN with numerous medication intolerances.\par \par She had 1 emergency room visit since our last visit here on August 30, 2022 with complaints of nausea and decreased oral intake it was noted that she was admitted to [facility] 1 month prior with a UTI.  She had had another UTI treated with ciprofloxacin after that.  She described bilateral chest discomfort and a headache cardiac enzymes were negative she was treated with Zofran and IV fluids, \par Sodium was 133, she was diagnosed with another urinary tract infection treated with antibiotics, discharged from the emergency room\par \par \par She has sporadic chest discomfort, most recently last week. Most recently last week, right sided, sometimes on the left, pressure, lasting 10-15 minutes, relieved by relaxing. It is nonexertional, she thinks it may be related to emotional stress, fatigue, anxiety. \par She denies dyspnea.\par She has palpitations, also sporadically, had some last week.\par  No syncope.\par \par \par \par \par

## 2022-12-22 NOTE — CARDIOLOGY SUMMARY
[de-identified] : 12/22/22-nsr low voltage [de-identified] : 10/2022- normal, ef 89% [de-identified] : 9/2022- ef 65%, lvh. RVE,mild rv dysfunction, MAC.mild MR, RVSP40 mmhg [de-identified] : 2015- stents to native mRCA, all grafts closed but kilo to lad [de-identified] : 2013- kilo-lad, svg-om, rpda,rca, d1

## 2023-01-03 NOTE — ASSESSMENT
[FreeTextEntry1] : Pleasant 85 yo female with hx of previously poorly controlled HTN and chronic hyponatremia \par \par Hyponatremia\par  - both SNa and BP markedly improved  on nifedipine and metoprolol, guanfacin. 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 if she may benefit from tricuspid clip\par \par hPTH\par - check D2 and D3 levels\par - check PTH\par - will accellerate bone loss (pt on prolia) No

## 2023-01-12 ENCOUNTER — RX RENEWAL (OUTPATIENT)
Age: 88
End: 2023-01-12

## 2023-01-19 PROCEDURE — 93248 EXT ECG>7D<15D REV&INTERPJ: CPT

## 2023-01-22 ENCOUNTER — RESULT REVIEW (OUTPATIENT)
Age: 88
End: 2023-01-22

## 2023-01-29 ENCOUNTER — RX RENEWAL (OUTPATIENT)
Age: 88
End: 2023-01-29

## 2023-02-09 ENCOUNTER — RESULT REVIEW (OUTPATIENT)
Age: 88
End: 2023-02-09

## 2023-02-09 ENCOUNTER — APPOINTMENT (OUTPATIENT)
Dept: NEPHROLOGY | Facility: CLINIC | Age: 88
End: 2023-02-09
Payer: MEDICARE

## 2023-02-09 VITALS
HEART RATE: 86 BPM | WEIGHT: 117 LBS | DIASTOLIC BLOOD PRESSURE: 70 MMHG | TEMPERATURE: 96.2 F | BODY MASS INDEX: 21.53 KG/M2 | SYSTOLIC BLOOD PRESSURE: 110 MMHG | OXYGEN SATURATION: 98 % | HEIGHT: 62 IN

## 2023-02-09 DIAGNOSIS — I70.1 ATHEROSCLEROSIS OF RENAL ARTERY: ICD-10-CM

## 2023-02-09 PROCEDURE — 99214 OFFICE O/P EST MOD 30 MIN: CPT

## 2023-02-09 NOTE — ASSESSMENT
[FreeTextEntry1] : Pleasant 86 yo female with hx of previously poorly controlled HTN and chronic hyponatremia \par \par Hyponatremia\par  - both SNa and BP markedly improved  on nifedipine and metoprolol, guanfacin. 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 if she may benefit from tricuspid clip, repeat ECHO with Cardiology showed only MILD TR\par - seeing cardiology\par - zio patch place - will f/u with Dr. ramsey\par \par hPTH\par - check D2 and D3 levels\par - check PTH check Mag\par - will accelerate bone loss (pt was on prolia now on fosamax - c/o reflux despite PPI)\par \par Hospital records reviewed\par

## 2023-02-09 NOTE — HISTORY OF PRESENT ILLNESS
[FreeTextEntry1] : 86 yo female initially referred by Dr. 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),exacerbated by combination of guanfacin, nifedipine/CCB however is tolerating it and using compression stockings - \par - SNa 140 ( since 8/2020), had been fluctuating between 126 and 138 since 2005 (126 in 2005, 138 4/2018, 127 8/2018),\par - serum sodium very low during hospitalization in 10/2017 at 258, urine osm also low at that time 350 as was urine sodium 27 suggesting lack of solute intake with ongoing hypotonic fluid intake\par \par - has longstanding hx of poorly controlled htn and is taking guanfacin ( alpha 2 adrenergic), valsartan, metoprolol succ er 100mg and metoprolol 25mg \par \par - had ECHO, interestingly found to have 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 Cope [facility]ro [facility]\par - started on Methenamine Hippurate, feels much better\par -  had a fallen, lost weight caring for him, \par \par 11/2022\par - was seen by  Dr. Cope [facility]ro Gyn\par - started on Methenamine Hippurate, feels much better\par -  had a fallen, lost weight caring for him, \par \par 8/10/22\par - hospitalized with sepsis 2/2 UTI\par - was having symptoms took hyoscamin\par - since discharge developed another UTI. E. faecalis - started on cipro by OB\par \par \par \par \par \par

## 2023-02-14 ENCOUNTER — APPOINTMENT (OUTPATIENT)
Dept: ENDOCRINOLOGY | Facility: CLINIC | Age: 88
End: 2023-02-14
Payer: MEDICARE

## 2023-02-14 VITALS
HEART RATE: 70 BPM | OXYGEN SATURATION: 97 % | SYSTOLIC BLOOD PRESSURE: 108 MMHG | BODY MASS INDEX: 21.53 KG/M2 | DIASTOLIC BLOOD PRESSURE: 70 MMHG | HEIGHT: 62 IN | WEIGHT: 117 LBS

## 2023-02-14 PROCEDURE — 99214 OFFICE O/P EST MOD 30 MIN: CPT

## 2023-02-17 NOTE — HISTORY OF PRESENT ILLNESS
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            .\par            Impression: Needs markers of bone turnover, likely that Prolia would potentially offer her more benefit than the calcitonin.\par            Plan: Udated bone density; X-ray spine; TFTs and thyroid sonogram.\par            .\par            ====\par            Returns for osteoporosis. Had good effect from two years of Forteo. Now on Miacalcin (generic causes nasal irritation). Insurance hange may not cover brand, however, Last bone density. in 2011 so she will go for follow up in 2013 at [facility] -spine T -3.1 \par            .\par            She has reactions to many medications. She sees Dr. Carter for HBP; she has seen Dr. Peri Robbins for DICKEY, Dr. Tucker is her ENT and she regularly follows with Dr. Davie Ayala. \par

## 2023-02-17 NOTE — HISTORY OF PRESENT ILLNESS
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            .\par            Impression: Needs markers of bone turnover, likely that Prolia would potentially offer her more benefit than the calcitonin.\par            Plan: Udated bone density; X-ray spine; TFTs and thyroid sonogram.\par            .\par            ====\par            Returns for osteoporosis. Had good effect from two years of Forteo. Now on Miacalcin (generic causes nasal irritation). Insurance hange may not cover brand, however, Last bone density. in 2011 so she will go for follow up in 2013 at [facility] -spine T -3.1 \par            .\par            She has reactions to many medications. She sees Dr. Carter for HBP; she has seen Dr. Peri Robbins for DICKEY, Dr. Tucker is her ENT and she regularly follows with Dr. Davie Ayala. \par

## 2023-02-19 LAB
25(OH)D3 SERPL-MCNC: 40.2 NG/ML
CALCIUM SERPL-MCNC: 10.7 MG/DL
ESTIMATED AVERAGE GLUCOSE: 126 MG/DL
HBA1C MFR BLD HPLC: 6 %
OSTEOCALCIN SERPL-MCNC: 55.1 NG/ML
PARATHYROID HORMONE INTACT: 24 PG/ML
PHOSPHATE SERPL-MCNC: 4.6 MG/DL
TSH SERPL-ACNC: 4.48 UIU/ML

## 2023-02-20 LAB — COLLAGEN CTX SERPL-MCNC: 1050 PG/ML

## 2023-02-25 LAB — CA-I SERPL-SCNC: 5.2 MG/DL

## 2023-04-07 ENCOUNTER — RESULT REVIEW (OUTPATIENT)
Age: 88
End: 2023-04-07

## 2023-04-10 ENCOUNTER — RESULT REVIEW (OUTPATIENT)
Age: 88
End: 2023-04-10

## 2023-04-14 PROBLEM — Z01.419 ENCOUNTER FOR GYNECOLOGICAL EXAMINATION: Status: ACTIVE | Noted: 2023-04-14

## 2023-04-18 ENCOUNTER — APPOINTMENT (OUTPATIENT)
Dept: OBGYN | Facility: CLINIC | Age: 88
End: 2023-04-18
Payer: MEDICARE

## 2023-04-18 VITALS
SYSTOLIC BLOOD PRESSURE: 130 MMHG | BODY MASS INDEX: 21.35 KG/M2 | HEIGHT: 62 IN | DIASTOLIC BLOOD PRESSURE: 70 MMHG | WEIGHT: 116 LBS

## 2023-04-18 DIAGNOSIS — Z01.419 ENCOUNTER FOR GYNECOLOGICAL EXAMINATION (GENERAL) (ROUTINE) W/OUT ABNORMAL FINDINGS: ICD-10-CM

## 2023-04-18 PROCEDURE — 57160 INSERT PESSARY/OTHER DEVICE: CPT

## 2023-04-18 PROCEDURE — G0101: CPT

## 2023-04-29 ENCOUNTER — RX RENEWAL (OUTPATIENT)
Age: 88
End: 2023-04-29

## 2023-05-10 ENCOUNTER — APPOINTMENT (OUTPATIENT)
Dept: NEPHROLOGY | Facility: CLINIC | Age: 88
End: 2023-05-10
Payer: MEDICARE

## 2023-05-10 ENCOUNTER — RESULT REVIEW (OUTPATIENT)
Age: 88
End: 2023-05-10

## 2023-05-10 VITALS
OXYGEN SATURATION: 96 % | BODY MASS INDEX: 21.53 KG/M2 | TEMPERATURE: 98.1 F | WEIGHT: 117 LBS | HEIGHT: 62 IN | SYSTOLIC BLOOD PRESSURE: 140 MMHG | HEART RATE: 69 BPM | DIASTOLIC BLOOD PRESSURE: 70 MMHG

## 2023-05-10 PROCEDURE — 99214 OFFICE O/P EST MOD 30 MIN: CPT

## 2023-05-10 NOTE — HISTORY OF PRESENT ILLNESS
[FreeTextEntry1] : 87 yo female initially referred by Dr. 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),exacerbated by combination of guanfacin, nifedipine/CCB however is tolerating it and using compression stockings - \par - SNa 140 ( since 8/2020), had been fluctuating between 126 and 138 since 2005 (126 in 2005, 138 4/2018, 127 8/2018),\par - serum sodium very low during hospitalization in 10/2017 at 258, urine osm also low at that time 350 as was urine sodium 27 suggesting lack of solute intake with ongoing hypotonic fluid intake\par \par - has longstanding hx of poorly controlled htn and is taking guanfacin ( alpha 2 adrenergic), valsartan, metoprolol succ er 100mg and metoprolol 25mg \par \par - had ECHO, interestingly found to have 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 Cope [facility]ro G[facility]\par - started on Methenamine Hippurate, feels much better\par -  had a fallen, lost weight caring for him, \par \par 11/2022\par - was seen by  Dr. Cope Uro Gyn\par - started on Methenamine Hippurate, feels much better\par -  had a fallen, lost weight caring for him, \par \par 8/10/22\par - hospitalized with sepsis 2/2 UTI\par - was having symptoms took hyoscamin\par - since discharge developed another UTI. E. faecalis - started on cipro by OB\par \par \par \par \par \par

## 2023-05-10 NOTE — ASSESSMENT
[FreeTextEntry1] : Pleasant 89 yo female with hx of previously poorly controlled HTN and chronic hyponatremia \par \par Hyponatremia\par  - both SNa and BP markedly improved  on nifedipine and metoprolol, guanfacin. 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 Henriquez\par \par hPTH\par - checked D2 and D3 levels\par - recheck PTH check Mag\par - will accelerate bone loss (pt was on prolia now on fosamax - c/o reflux despite PPI - stopped fosamax)\par \par \par Hospital records reviewed\par

## 2023-06-09 ENCOUNTER — APPOINTMENT (OUTPATIENT)
Dept: CARDIOLOGY | Facility: CLINIC | Age: 88
End: 2023-06-09
Payer: MEDICARE

## 2023-06-09 ENCOUNTER — APPOINTMENT (OUTPATIENT)
Dept: CARDIOLOGY | Facility: CLINIC | Age: 88
End: 2023-06-09

## 2023-06-09 PROCEDURE — 99442: CPT | Mod: 95

## 2023-07-28 ENCOUNTER — RX RENEWAL (OUTPATIENT)
Age: 88
End: 2023-07-28

## 2023-08-10 ENCOUNTER — APPOINTMENT (OUTPATIENT)
Dept: NEPHROLOGY | Facility: CLINIC | Age: 88
End: 2023-08-10
Payer: MEDICARE

## 2023-08-10 ENCOUNTER — RESULT REVIEW (OUTPATIENT)
Age: 88
End: 2023-08-10

## 2023-08-10 VITALS
SYSTOLIC BLOOD PRESSURE: 130 MMHG | HEIGHT: 62 IN | DIASTOLIC BLOOD PRESSURE: 70 MMHG | BODY MASS INDEX: 21.16 KG/M2 | HEART RATE: 75 BPM | WEIGHT: 115 LBS | OXYGEN SATURATION: 95 %

## 2023-08-10 PROCEDURE — 99214 OFFICE O/P EST MOD 30 MIN: CPT

## 2023-08-10 NOTE — HISTORY OF PRESENT ILLNESS
[FreeTextEntry1] : 87 yo female initially referred by Dr. 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),exacerbated by combination of guanfacin, nifedipine/CCB however is tolerating it and using compression stockings -  - SNa 140 ( since 8/2020), had been fluctuating between 126 and 138 since 2005 (126 in 2005, 138 4/2018, 127 8/2018), - serum sodium very low during hospitalization in 10/2017 at 258, urine osm also low at that time 350 as was urine sodium 27 suggesting lack of solute intake with ongoing hypotonic fluid intake  - has longstanding hx of poorly controlled htn and is taking guanfacin ( alpha 2 adrenergic), valsartan, metoprolol succ er 100mg and metoprolol 25mg   - had ECHO, interestingly found to have 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 Cope [facility]ro Gyn - started on Methenamine Hippurate, feels much better -  had a fallen, lost weight caring for him,   11/2022 - was seen by  Dr. Cope Uro Gyn - started on Methenamine Hippurate, feels much better -  had a fallen, lost weight caring for him,   8/10/22 - hospitalized with sepsis 2/2 UTI - was having symptoms took hyoscamin - since discharge developed another UTI. E. faecalis - started on cipro by OB

## 2023-08-10 NOTE — PHYSICAL EXAM
[General Appearance - Alert] : alert [General Appearance - In No Acute Distress] : in no acute distress [Sclera] : the sclera and conjunctiva were normal [PERRL With Normal Accommodation] : pupils were equal in size, round, and reactive to light [Outer Ear] : the ears and nose were normal in appearance [Neck Appearance] : the appearance of the neck was normal [Respiration, Rhythm And Depth] : normal respiratory rhythm and effort [Apical Impulse] : the apical impulse was normal [Heart Rate And Rhythm] : heart rate was normal and rhythm regular [FreeTextEntry1] : +ve biol le edema, L>R [Bowel Sounds] : normal bowel sounds [Abdomen Soft] : soft [Abnormal Walk] : normal gait [Musculoskeletal - Swelling] : no joint swelling seen [Skin Color & Pigmentation] : normal skin color and pigmentation [] : no rash [Cranial Nerves] : cranial nerves 2-12 were intact [No Focal Deficits] : no focal deficits [Oriented To Time, Place, And Person] : oriented to person, place, and time [Impaired Insight] : insight and judgment were intact

## 2023-08-21 ENCOUNTER — APPOINTMENT (OUTPATIENT)
Dept: ENDOCRINOLOGY | Facility: CLINIC | Age: 88
End: 2023-08-21
Payer: MEDICARE

## 2023-08-21 VITALS
HEART RATE: 62 BPM | DIASTOLIC BLOOD PRESSURE: 56 MMHG | WEIGHT: 119 LBS | BODY MASS INDEX: 21.9 KG/M2 | HEIGHT: 62 IN | OXYGEN SATURATION: 96 % | SYSTOLIC BLOOD PRESSURE: 110 MMHG

## 2023-08-21 PROCEDURE — 36415 COLL VENOUS BLD VENIPUNCTURE: CPT

## 2023-08-21 PROCEDURE — 99214 OFFICE O/P EST MOD 30 MIN: CPT | Mod: 25

## 2023-08-21 NOTE — HISTORY OF PRESENT ILLNESS
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 Ayala.

## 2023-08-22 ENCOUNTER — APPOINTMENT (OUTPATIENT)
Dept: OBGYN | Facility: CLINIC | Age: 88
End: 2023-08-22
Payer: MEDICARE

## 2023-08-22 PROCEDURE — 57160 INSERT PESSARY/OTHER DEVICE: CPT

## 2023-08-23 LAB
ANION GAP SERPL CALC-SCNC: 14 MMOL/L
BUN SERPL-MCNC: 16 MG/DL
CALCIUM SERPL-MCNC: 9.6 MG/DL
CHLORIDE SERPL-SCNC: 102 MMOL/L
CO2 SERPL-SCNC: 24 MMOL/L
CREAT SERPL-MCNC: 0.77 MG/DL
EGFR: 74 ML/MIN/1.73M2
GLUCOSE SERPL-MCNC: 110 MG/DL
POTASSIUM SERPL-SCNC: 4.2 MMOL/L
SODIUM SERPL-SCNC: 140 MMOL/L

## 2023-09-15 ENCOUNTER — APPOINTMENT (OUTPATIENT)
Dept: CARDIOLOGY | Facility: CLINIC | Age: 88
End: 2023-09-15

## 2023-10-10 ENCOUNTER — RX RENEWAL (OUTPATIENT)
Age: 88
End: 2023-10-10

## 2023-10-19 ENCOUNTER — APPOINTMENT (OUTPATIENT)
Dept: GASTROENTEROLOGY | Facility: CLINIC | Age: 88
End: 2023-10-19
Payer: MEDICARE

## 2023-10-19 VITALS
WEIGHT: 119 LBS | BODY MASS INDEX: 21.9 KG/M2 | SYSTOLIC BLOOD PRESSURE: 110 MMHG | HEIGHT: 62 IN | DIASTOLIC BLOOD PRESSURE: 60 MMHG

## 2023-10-19 DIAGNOSIS — I25.10 ATHEROSCLEROTIC HEART DISEASE OF NATIVE CORONARY ARTERY W/OUT ANGINA PECTORIS: ICD-10-CM

## 2023-10-19 DIAGNOSIS — R73.09 OTHER ABNORMAL GLUCOSE: ICD-10-CM

## 2023-10-19 DIAGNOSIS — K21.9 GASTRO-ESOPHAGEAL REFLUX DISEASE W/OUT ESOPHAGITIS: ICD-10-CM

## 2023-10-19 PROCEDURE — 99214 OFFICE O/P EST MOD 30 MIN: CPT

## 2023-10-24 ENCOUNTER — APPOINTMENT (OUTPATIENT)
Dept: HEART AND VASCULAR | Facility: CLINIC | Age: 88
End: 2023-10-24
Payer: MEDICARE

## 2023-10-24 VITALS
HEART RATE: 101 BPM | SYSTOLIC BLOOD PRESSURE: 148 MMHG | TEMPERATURE: 97.8 F | HEIGHT: 62 IN | OXYGEN SATURATION: 93 % | WEIGHT: 116 LBS | DIASTOLIC BLOOD PRESSURE: 70 MMHG | BODY MASS INDEX: 21.35 KG/M2

## 2023-10-24 DIAGNOSIS — G45.9 TRANSIENT CEREBRAL ISCHEMIC ATTACK, UNSPECIFIED: ICD-10-CM

## 2023-10-24 PROCEDURE — 99213 OFFICE O/P EST LOW 20 MIN: CPT

## 2023-10-27 PROBLEM — G45.9 TRANSIENT ISCHEMIC ATTACK: Status: ACTIVE | Noted: 2022-08-12

## 2023-11-01 ENCOUNTER — RX RENEWAL (OUTPATIENT)
Age: 88
End: 2023-11-01

## 2023-11-01 RX ORDER — NIFEDIPINE 60 MG/1
60 TABLET, FILM COATED, EXTENDED RELEASE ORAL
Qty: 90 | Refills: 3 | Status: ACTIVE | COMMUNITY
Start: 2019-03-18 | End: 1900-01-01

## 2023-11-02 ENCOUNTER — RX RENEWAL (OUTPATIENT)
Age: 88
End: 2023-11-02

## 2023-11-03 ENCOUNTER — RX RENEWAL (OUTPATIENT)
Age: 88
End: 2023-11-03

## 2024-01-28 ENCOUNTER — RX RENEWAL (OUTPATIENT)
Age: 89
End: 2024-01-28

## 2024-02-04 ENCOUNTER — RX RENEWAL (OUTPATIENT)
Age: 89
End: 2024-02-04

## 2024-02-14 ENCOUNTER — APPOINTMENT (OUTPATIENT)
Dept: NEPHROLOGY | Facility: CLINIC | Age: 89
End: 2024-02-14
Payer: MEDICARE

## 2024-02-14 ENCOUNTER — RESULT REVIEW (OUTPATIENT)
Age: 89
End: 2024-02-14

## 2024-02-14 VITALS
HEIGHT: 62 IN | WEIGHT: 115 LBS | SYSTOLIC BLOOD PRESSURE: 130 MMHG | HEART RATE: 83 BPM | BODY MASS INDEX: 21.16 KG/M2 | OXYGEN SATURATION: 96 % | DIASTOLIC BLOOD PRESSURE: 70 MMHG

## 2024-02-14 DIAGNOSIS — H35.60 RETINAL HEMORRHAGE, UNSPECIFIED EYE: ICD-10-CM

## 2024-02-14 DIAGNOSIS — E21.3 HYPERPARATHYROIDISM, UNSPECIFIED: ICD-10-CM

## 2024-02-14 PROCEDURE — 99214 OFFICE O/P EST MOD 30 MIN: CPT

## 2024-02-14 NOTE — HISTORY OF PRESENT ILLNESS
[FreeTextEntry1] : 87 yo female initially referred by Dr. 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),exacerbated by combination of guanfacin, nifedipine/CCB however is tolerating it and using compression stockings -  - SNa 140 ( since 8/2020), had been fluctuating between 126 and 138 since 2005 (126 in 2005, 138 4/2018, 127 8/2018), - serum sodium very low during hospitalization in 10/2017 at 258, urine osm also low at that time 350 as was urine sodium 27 suggesting lack of solute intake with ongoing hypotonic fluid intake  - has longstanding hx of poorly controlled htn and is taking guanfacin ( alpha 2 adrenergic), valsartan, metoprolol succ er 100mg and metoprolol 25mg   - had ECHO, interestingly found to have 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 Henriquez and Dr. 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 Cope [facility] - started on Methenamine Hippurate, feels much better -  had a fallen, lost weight caring for him,   11/2022 - was seen by  Dr. Cope Uro Gyn - started on Methenamine Hippurate, feels much better -  had a fallen, lost weight caring for him,   8/10/22 - hospitalized with sepsis 2/2 UTI - was having symptoms took hyoscamin - since discharge developed another UTI. E. faecalis - started on cipro by OB

## 2024-02-14 NOTE — ASSESSMENT
[FreeTextEntry1] : Pleasant 89 yo female with hx of previously poorly controlled HTN and chronic hyponatremia   Hyponatremia  - both SNa and BP markedly improved  on nifedipine and metoprolol, guanfacin. 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

## 2024-02-16 PROBLEM — N81.11 MIDLINE CYSTOCELE: Status: ACTIVE | Noted: 2022-04-14

## 2024-02-16 PROBLEM — N81.4 UTERINE PROLAPSE: Status: ACTIVE | Noted: 2022-04-14

## 2024-02-16 PROBLEM — N81.6 RECTOCELE: Status: ACTIVE | Noted: 2022-05-12

## 2024-02-20 ENCOUNTER — APPOINTMENT (OUTPATIENT)
Dept: OBGYN | Facility: CLINIC | Age: 89
End: 2024-02-20
Payer: MEDICARE

## 2024-02-20 VITALS
WEIGHT: 115 LBS | DIASTOLIC BLOOD PRESSURE: 70 MMHG | BODY MASS INDEX: 15.58 KG/M2 | SYSTOLIC BLOOD PRESSURE: 130 MMHG | HEIGHT: 72 IN

## 2024-02-20 DIAGNOSIS — N81.4 UTEROVAGINAL PROLAPSE, UNSPECIFIED: ICD-10-CM

## 2024-02-20 DIAGNOSIS — Z12.31 ENCOUNTER FOR SCREENING MAMMOGRAM FOR MALIGNANT NEOPLASM OF BREAST: ICD-10-CM

## 2024-02-20 DIAGNOSIS — N81.6 RECTOCELE: ICD-10-CM

## 2024-02-20 DIAGNOSIS — N81.11 CYSTOCELE, MIDLINE: ICD-10-CM

## 2024-02-20 PROCEDURE — 57160 INSERT PESSARY/OTHER DEVICE: CPT

## 2024-02-20 NOTE — PHYSICAL EXAM
[Chaperone Present] : A chaperone was present in the examining room during all aspects of the physical examination [Soft] : soft [Non-tender] : non-tender [No HSM] : No HSM [No Mass] : no mass [FreeTextEntry6] : surg scar at 9:00 L brst [Examination Of The Breasts] : a normal appearance [No Masses] : no breast masses were palpable [Vulvar Atrophy] : vulvar atrophy [Labia Majora] : normal [Labia Minora] : normal [Atrophy] : atrophy [Normal] : normal [Enlarged ___ wks] : not enlarged [Anteversion] : anteverted [Uterine Adnexae] : normal [Nl Sphincter Tone] : normal sphincter tone [FreeTextEntry9] : no masses [FreeTextEntry1] : Pearl Stevens. MOA [FreeTextEntry3] : No thyroid nodules [FreeTextEntry7] : surg scars [FreeTextEntry4] : lax introitus

## 2024-02-20 NOTE — HISTORY OF PRESENT ILLNESS
[Patient reported mammogram was normal] : Patient reported mammogram was normal [Patient reported colonoscopy was normal] : Patient reported colonoscopy was normal [TextBox_4] : 5/12/22 insertion of a #5 ring pessary with support for management of patient's uterine prolapse, cystocele and rectocele with the cervix protruding about 4 cm past the introitus. Dr. Davie Ayala's note indicates that the patient was admitted for ~3 days to [facility] with urosepsis. \par  Pt saw Dr. Singer who offered pt surg & has placed her on UTI suppressive therapy w/ Methenamine hippurate 1 gm bid.\par  No gyn complaints such as PMB.\par  Taking Prolia.\par  Normal bowel function.\par   [Mammogramdate] : 4/7//2023 [TextBox_19] : Scattered fibroglandular; neg. [BoneDensityDate] : 4/10/2023 [TextBox_37] : T -2.6 at L1-3; left femoral neck -3.0; left hip -2.2.  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  Patient is seeing Dr. Hellerman. [TextBox_43] : Dr. RADHA Ayala

## 2024-02-21 ENCOUNTER — APPOINTMENT (OUTPATIENT)
Dept: ENDOCRINOLOGY | Facility: CLINIC | Age: 89
End: 2024-02-21
Payer: MEDICARE

## 2024-02-21 VITALS
OXYGEN SATURATION: 98 % | SYSTOLIC BLOOD PRESSURE: 122 MMHG | HEIGHT: 72 IN | HEART RATE: 67 BPM | DIASTOLIC BLOOD PRESSURE: 60 MMHG | BODY MASS INDEX: 15.64 KG/M2 | WEIGHT: 115.5 LBS

## 2024-02-21 DIAGNOSIS — E03.9 HYPOTHYROIDISM, UNSPECIFIED: ICD-10-CM

## 2024-02-21 PROCEDURE — G2211 COMPLEX E/M VISIT ADD ON: CPT

## 2024-02-21 PROCEDURE — 36415 COLL VENOUS BLD VENIPUNCTURE: CPT

## 2024-02-21 PROCEDURE — 99215 OFFICE O/P EST HI 40 MIN: CPT

## 2024-02-21 NOTE — HISTORY OF PRESENT ILLNESS
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 report. Spine T -3.1/ Z -0.5 and Left hip T -2.2 / Z -0.2            Femoral neck T -3.0, Z -0.7            .            Oral surgeon - David - will do implants.            Impression: Benefitted from Forteo.            Gets bad GERD            Did not toleratee bisphosphoates in past.             BD fairly stable.            now on miacalcin.            Will plan to do Prolia after implants completed.            .            ==            2013            PCP: Dr. Davie Aylaa             Cardiology: Dr. Carter             Neuro: Dr. Peri Robbins             .             CC: Osteoporosis; early hypothyroid            .            Previous note appended below.            Since last visit, BP difficult to control -> MR Angiogram -> R RAHDA -> [facility] for stent by Dr. Fitzgerald.            Then developed shingles -> Valtrex            .            Impression: Needs markers of bone turnover, likely that Prolia would potentially offer her more benefit than the calcitonin.            Plan: Udated bone density; X-ray spine; TFTs and thyroid sonogram.            .            ====            Returns for osteoporosis. Had good effect from two years of Forteo. Now on Miacalcin (generic causes nasal irritation). Insurance hange may not cover brand, however, Last bone density. in 2011 so she will go for follow up in 2013 at [place] -spine T -3.1             .            She has reactions to many medications. She sees Dr. Carter for HBP; she has seen Dr. Peri Robbins for DICKEY, Dr. Tucker is her ENT and she regularly follows with Dr. Davie Ayala.

## 2024-03-26 LAB
25(OH)D3 SERPL-MCNC: 33 NG/ML
COLLAGEN CTX SERPL-MCNC: 56 PG/ML
ESTIMATED AVERAGE GLUCOSE: 126 MG/DL
HBA1C MFR BLD HPLC: 6 %
METANEPHRINE, PL: 69.6 PG/ML
NORMETANEPHRINE, PL: 259.8 PG/ML
OSTEOCALCIN SERPL-MCNC: 5.8 NG/ML
T4 SERPL-MCNC: 8 UG/DL
TSH SERPL-ACNC: 2.8 UIU/ML

## 2024-04-08 ENCOUNTER — RX RENEWAL (OUTPATIENT)
Age: 89
End: 2024-04-08

## 2024-04-08 RX ORDER — METOPROLOL SUCCINATE 50 MG/1
50 TABLET, EXTENDED RELEASE ORAL
Qty: 90 | Refills: 3 | Status: ACTIVE | COMMUNITY
Start: 2017-12-18 | End: 1900-01-01

## 2024-04-14 ENCOUNTER — RX RENEWAL (OUTPATIENT)
Age: 89
End: 2024-04-14

## 2024-05-01 ENCOUNTER — APPOINTMENT (OUTPATIENT)
Dept: ORTHOPEDIC SURGERY | Facility: CLINIC | Age: 89
End: 2024-05-01
Payer: MEDICARE

## 2024-05-01 ENCOUNTER — RESULT REVIEW (OUTPATIENT)
Age: 89
End: 2024-05-01

## 2024-05-01 VITALS — BODY MASS INDEX: 21.71 KG/M2 | HEIGHT: 61 IN | WEIGHT: 115 LBS

## 2024-05-01 DIAGNOSIS — M70.61 TROCHANTERIC BURSITIS, RIGHT HIP: ICD-10-CM

## 2024-05-01 PROCEDURE — 99203 OFFICE O/P NEW LOW 30 MIN: CPT

## 2024-05-01 NOTE — DISCUSSION/SUMMARY
[de-identified] : low back arthritis/radiculitis w  some developing bursitis will send to pt if not better troch injection vs return to pain management

## 2024-05-01 NOTE — HISTORY OF PRESENT ILLNESS
[de-identified] : right low back pain for a few weeks occ goes down side of leg no numbness or tingling no trauma no f/c/ns prior synovial cyst lumbar ablsation a few years ago

## 2024-05-01 NOTE — PHYSICAL EXAM
[de-identified] : nl  gait full painless rom right hip +ttp over troch no motor or sensory deficits [de-identified] : hip w preserved joint spaces

## 2024-05-04 RX ORDER — RABEPRAZOLE SODIUM 20 MG/1
20 TABLET, DELAYED RELEASE ORAL
Qty: 90 | Refills: 3 | Status: ACTIVE | COMMUNITY
Start: 2024-05-04 | End: 1900-01-01

## 2024-05-05 ENCOUNTER — RX RENEWAL (OUTPATIENT)
Age: 89
End: 2024-05-05

## 2024-05-05 RX ORDER — RABEPRAZOLE SODIUM 20 MG/1
20 TABLET, DELAYED RELEASE ORAL
Qty: 90 | Refills: 0 | Status: ACTIVE | COMMUNITY
Start: 2020-05-08 | End: 1900-01-01

## 2024-05-08 ENCOUNTER — APPOINTMENT (OUTPATIENT)
Dept: ENDOCRINOLOGY | Facility: CLINIC | Age: 89
End: 2024-05-08
Payer: MEDICARE

## 2024-05-08 VITALS
DIASTOLIC BLOOD PRESSURE: 80 MMHG | BODY MASS INDEX: 21.55 KG/M2 | OXYGEN SATURATION: 99 % | WEIGHT: 114.13 LBS | SYSTOLIC BLOOD PRESSURE: 116 MMHG | HEIGHT: 61 IN | HEART RATE: 66 BPM

## 2024-05-08 DIAGNOSIS — M81.0 AGE-RELATED OSTEOPOROSIS W/OUT CURRENT PATHOLOGICAL FRACTURE: ICD-10-CM

## 2024-05-08 PROCEDURE — 99215 OFFICE O/P EST HI 40 MIN: CPT

## 2024-05-08 RX ORDER — CLOPIDOGREL BISULFATE 75 MG/1
75 TABLET, FILM COATED ORAL DAILY
Qty: 90 | Refills: 3 | Status: DISCONTINUED | COMMUNITY
Start: 2017-11-29 | End: 2024-05-08

## 2024-05-09 ENCOUNTER — RX RENEWAL (OUTPATIENT)
Age: 89
End: 2024-05-09

## 2024-05-09 PROBLEM — M81.0 SENILE OSTEOPOROSIS: Status: ACTIVE | Noted: 2018-11-29

## 2024-05-09 RX ORDER — ISOSORBIDE MONONITRATE 30 MG/1
30 TABLET, EXTENDED RELEASE ORAL
Qty: 90 | Refills: 0 | Status: ACTIVE | COMMUNITY
Start: 2018-02-14

## 2024-05-09 RX ORDER — ROSUVASTATIN CALCIUM 5 MG/1
5 TABLET, FILM COATED ORAL
Qty: 90 | Refills: 3 | Status: ACTIVE | COMMUNITY
Start: 2022-04-02

## 2024-05-09 RX ORDER — GUANFACINE 1 MG/1
1 TABLET ORAL TWICE DAILY
Qty: 180 | Refills: 3 | Status: ACTIVE | COMMUNITY
Start: 2019-05-21

## 2024-05-09 NOTE — HISTORY OF PRESENT ILLNESS
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 Hypothyroiid             Osteoporosis: Forteo 2008 x 2 years (spine T -3.3)              (ASHD, PVD, renal artery stenosis - )             .            # - Developed foot pain in May (during cardiac rehab) saw Dr. Farrell, told of plantar fasciitis and metatarsal fracture, tendonitis, skin irritation. Underwent doppler study and was told of mild PAD. Then saw Dr. Fitzgerald (who placed R renal stents 2013)            .            Visits for osteoporosis-treated with Forteo in , then because of dental issues, have not been able to start an anti-resorptive (outside of salmon calciitonin spray - which she no longer takes). She is currently seeing oral surgeon, Dr. Gabriel, who has advised her that she can been treated with Prolia by 2014.            .            2014, X-ray spine showed no evidence of compression fracture.            Most recent BD in 2014: Spine T -3.1, Z -0.4             Left femoral neck T -3.0 Z -0.7.            .            Current medications: Dexilant, Bystolic Diovan, Simvastatin, Carafate, Miacalcin.             .            Plan: Prolia - after March labs.             .            ===            2014            PCP: Dr. Davie Ayala            CC: Osteoporosis and hypothyroid            .            # Walking across campus would get short of breath. Had labile hypertension.            After nuclear stress test, went for angiogram at [facility] followed by bypass surgery. Hospitalized at [facility] for 10 days.             ..            # Osteoporosis. Recent Diez report. Spine T -3.1/ Z -0.5 and Left hip T -2.2 / Z -0.2            Femoral neck T -3.0, Z -0.7            .            Oral surgeon - David - will do implants.            Impression: Benefitted from Forteo.            Gets bad GERD            Did not toleratee bisphosphoates in past.             BD fairly stable.            now on miacalcin.            Will plan to do Prolia after implants completed.            .            ==            2013            PCP: Dr. Davie Ayala             Cardiology: Dr. Carter             Neuro: Dr. Peri Robbins             .             CC: Osteoporosis; early hypothyroid            .            Previous note appended below.            Since last visit, BP difficult to control -> MR Angiogram -> R RADHA -> [facility] for stent by Dr. Fitzgerald.            Then developed shingles -> Valtrex            .            Impression: Needs markers of bone turnover, likely that Prolia would potentially offer her more benefit than the calcitonin.            Plan: Udated bone density; X-ray spine; TFTs and thyroid sonogram.            .            ====            Returns for osteoporosis. Had good effect from two years of Forteo. Now on Miacalcin (generic causes nasal irritation). Insurance [employer] may not cover brand, however, Last bone density. in 2011 so she will go for follow up in 2013 at [facility] -spine T -3.1             .            She has reactions to many medications. She sees Dr. Carter for HBP; she has seen Dr. Peri Robbins for DICKEY, Dr. Tucker is her ENT and she regularly follows with Dr. Davie Ayala.

## 2024-05-10 ENCOUNTER — RESULT REVIEW (OUTPATIENT)
Age: 89
End: 2024-05-10

## 2024-05-10 ENCOUNTER — APPOINTMENT (OUTPATIENT)
Dept: NEPHROLOGY | Facility: CLINIC | Age: 89
End: 2024-05-10
Payer: MEDICARE

## 2024-05-10 VITALS
HEIGHT: 61 IN | BODY MASS INDEX: 21.52 KG/M2 | DIASTOLIC BLOOD PRESSURE: 70 MMHG | HEART RATE: 72 BPM | WEIGHT: 114 LBS | SYSTOLIC BLOOD PRESSURE: 150 MMHG | OXYGEN SATURATION: 98 %

## 2024-05-10 DIAGNOSIS — E78.5 HYPERLIPIDEMIA, UNSPECIFIED: ICD-10-CM

## 2024-05-10 DIAGNOSIS — I10 ESSENTIAL (PRIMARY) HYPERTENSION: ICD-10-CM

## 2024-05-10 DIAGNOSIS — Z79.01 LONG TERM (CURRENT) USE OF ANTICOAGULANTS: ICD-10-CM

## 2024-05-10 DIAGNOSIS — E87.1 HYPO-OSMOLALITY AND HYPONATREMIA: ICD-10-CM

## 2024-05-10 PROCEDURE — 99214 OFFICE O/P EST MOD 30 MIN: CPT

## 2024-05-13 PROBLEM — E78.5 HYPERLIPIDEMIA: Status: ACTIVE | Noted: 2022-04-02

## 2024-05-13 PROBLEM — Z79.01 ANTICOAGULANT LONG-TERM USE: Status: ACTIVE | Noted: 2024-02-14

## 2024-05-13 PROBLEM — E87.1 HYPONATREMIA: Status: ACTIVE | Noted: 2018-12-05

## 2024-05-13 NOTE — HISTORY OF PRESENT ILLNESS
[FreeTextEntry1] : 88 yo female initially referred by Dr. Ayala for hyponatremia and uncontrolled, resistant HTN  From previous visit(s) - SNa and BP improved on current meds - nausea improved as is back pain since having synovial cyst removed from spine  - BP remains "best in 30 years" - still c/o some le edema more so L leg ( side she had sciatica and weakness along with muscle atrophy, not to mention vein harvesting when she had CABG),exacerbated by combination of guanfacin, nifedipine/CCB however is tolerating it and using compression stockings -  - SNa 140 ( since 8/2020), had been fluctuating between 126 and 138 since 2005 (126 in 2005, 138 4/2018, 127 8/2018), - serum sodium very low during hospitalization in 10/2017 at 258, urine osm also low at that time 350 as was urine sodium 27 suggesting lack of solute intake with ongoing hypotonic fluid intake  - has longstanding hx of poorly controlled htn and is taking guanfacin ( alpha 2 adrenergic), valsartan, metoprolol succ er 100mg and metoprolol 25mg   - had ECHO, interestingly found to have 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 Henriquez and Dr. Tabor (EP), had Holter,   5/2024 - BP well controlled - seeing cardiology, debating on having loop recorder - concern that she may have occult Afib - reported retinal hemorrhages, but per - cards note reviewed - has hx of TIA , possibly multiple ~2007/2008 when BP was severely out of control - carotid doppler reviewed, no sig hemodynamic stenosis - no clear e/o afib   2/2024 - struggling with the passing of friends - BP well controlled - seeing cardiology - cards note reviewed  8/2023 - remains on Methenamine Hippurate, feels much better - struggling with husbands health - RVO, resolving - 3rd time, discovered incidentally - seen by Cardiology, will have monitor implanted - seen by Endo, Hgb A1c elevated, TSH elevated, PTH , Phos elevated, ionized calcium normal   5/2023 - remains on Methenamine Hippurate, feels much better - struggling with husbands health - RVO, resolving - 3rd time, discovered incidentally - seen by Endo, Hgb A1c elevated, TSH elevated, PTH , Phos elevated, ionized calcium normal     2/2023 - was seen by  Dr. Cope Uro Gyn - started on Methenamine Hippurate, feels much better -  had a fallen, lost weight caring for him,   11/2022 - was seen by  Dr. Cope Uro Gyn - started on Methenamine Hippurate, feels much better -  had a fallen, lost weight caring for him,   8/10/22 - hospitalized with sepsis 2/2 UTI - was having symptoms took hyoscamin - since discharge developed another UTI. E. faecalis - started on cipro by OB

## 2024-05-13 NOTE — REASON FOR VISIT
Subjective:     Interval History: line placed with plans for starting C2A hyperCVAD. NAEON and VSS.     Objective:     Vital Signs (Most Recent):  Temp: 98.6 °F (37 °C) (02/11/23 0714)  Pulse: 72 (02/11/23 0714)  Resp: 17 (02/11/23 0714)  BP: 133/67 (02/11/23 0714)  SpO2: 97 % (02/11/23 0714) Vital Signs (24h Range):  Temp:  [97.8 °F (36.6 °C)-98.7 °F (37.1 °C)] 98.6 °F (37 °C)  Pulse:  [72-86] 72  Resp:  [16-18] 17  SpO2:  [96 %-100 %] 97 %  BP: (114-133)/(65-75) 133/67     Weight: 74.5 kg (164 lb 3.9 oz)  Body mass index is 25.72 kg/m².  Body surface area is 1.88 meters squared.    ECOG SCORE           [unfilled]    Intake/Output - Last 3 Shifts         02/09 0700  02/10 0659 02/10 0700  02/11 0659 02/11 0700  02/12 0659    P.O.  120     I.V. (mL/kg)  10 (0.1)     Total Intake(mL/kg)  130 (1.7)     Urine (mL/kg/hr)  0     Drains  50     Total Output  50     Net  +80            Urine Occurrence  2 x             Physical Exam  Constitutional:       Appearance: She is well-developed.   HENT:      Head: Normocephalic and atraumatic.      Mouth/Throat:      Pharynx: No oropharyngeal exudate.   Eyes:      Conjunctiva/sclera: Conjunctivae normal.      Pupils: Pupils are equal, round, and reactive to light.   Cardiovascular:      Rate and Rhythm: Normal rate and regular rhythm.      Heart sounds: Normal heart sounds. No murmur heard.  Pulmonary:      Effort: Pulmonary effort is normal.      Breath sounds: Normal breath sounds.   Abdominal:      General: Bowel sounds are normal. There is no distension.      Palpations: Abdomen is soft.      Tenderness: There is no abdominal tenderness.      Comments: Right upper quadrant bili drain in place. Dressing c/d/i. No sign of infection to site.   Musculoskeletal:         General: No deformity. Normal range of motion.      Cervical back: Normal range of motion and neck supple.   Skin:     General: Skin is warm and dry.      Findings: No erythema or rash.      Comments: Right arm  PICC, C/D/I   Neurological:      Mental Status: She is alert and oriented to person, place, and time.   Psychiatric:         Behavior: Behavior normal.         Thought Content: Thought content normal.         Judgment: Judgment normal.       Significant Labs:   CBC:   Recent Labs   Lab 02/09/23  1026 02/11/23  0344   WBC 5.95 3.97   HGB 9.4* 8.2*   HCT 29.7* 25.6*   PLT 30* 32*   , CMP:   Recent Labs   Lab 02/09/23  1026 02/11/23  0344    139   K 4.1 4.2    108   CO2 27 21*   * 88   BUN 24* 22   CREATININE 1.0 0.9   CALCIUM 8.8 9.2   PROT 5.8* 5.5*   ALBUMIN 3.6 3.1*   BILITOT 0.9 0.7   ALKPHOS 109 129   AST 23 18   ALT 41 39   ANIONGAP 7* 10   , and All pertinent labs from the last 24 hours have been reviewed.    Diagnostic Results:  I have reviewed all pertinent imaging results/findings within the past 24 hours.   [Follow-Up] : a follow-up visit [FreeTextEntry1] : f/u uncontrolled HTN

## 2024-05-13 NOTE — ASSESSMENT
[FreeTextEntry1] : Pleasant 89 yo female with hx of previously poorly controlled HTN and chronic hyponatremia   Hyponatremia  - both SNa and BP markedly improved  on nifedipine and metoprolol, guanfacin. Her sodium is also improved (140's)   Back pain - Her back pain is  improved  - off pain meds besides tylenol n( no ASA 2/2 guanfacin - edema sec to CCB, vein harvesting, debilitation/immobility, stasis - improved with exercise and weight loss - no longer using support stockings;   HTN -bp wnl tolerating reduced dose of metoprolol of 50mg ( was tapered from 125mg to 100mg and bp stable at this time) - continue  Valvular Heart Disease CAD - found to have mod TR, mild PA HTN, mild MR, suspect prim TR ( vs sec to pulm HTN) -  repeat ECHO with Cardiology showed only MILD TR - cardiology note reviewed -  - was on plavix, but stopped due to conern of retinal bleeding, but per pt  per Ophthalmology rec continue plavix, cardiology rec loop to r/o afib, to guide AC  Hx of TIAs - rec she see [facility] neurologist -  rec low dose ASA or plvix and checking plavix reactivity assay or aspirin reactivity assay  hPTH - checked D2 and D3 levels - recheck PTH check Mag - will accelerate bone loss (pt was on prolia now on fosamax - c/o reflux despite PPI - stopped fosamax)  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

## 2024-06-07 RX ORDER — LEVOTHYROXINE SODIUM 0.03 MG/1
25 TABLET ORAL
Qty: 45 | Refills: 3 | Status: ACTIVE | COMMUNITY
Start: 2022-01-05 | End: 1900-01-01

## 2024-07-21 PROBLEM — Z86.19 HISTORY OF SEPSIS: Status: RESOLVED | Noted: 2022-07-27 | Resolved: 2024-07-21

## 2024-07-21 PROBLEM — Z12.31 OTHER SCREENING MAMMOGRAM: Status: RESOLVED | Noted: 2024-02-20 | Resolved: 2024-07-21

## 2024-07-21 PROBLEM — M72.2 PLANTAR FASCIITIS, LEFT: Status: RESOLVED | Noted: 2022-03-10 | Resolved: 2024-07-21

## 2024-07-21 PROBLEM — N10 ACUTE BACTERIAL PYELONEPHRITIS: Status: RESOLVED | Noted: 2022-07-27 | Resolved: 2024-07-21

## 2024-07-21 PROBLEM — Z01.419 ENCOUNTER FOR GYNECOLOGICAL EXAMINATION: Status: RESOLVED | Noted: 2023-04-14 | Resolved: 2024-07-21

## 2024-07-24 ENCOUNTER — APPOINTMENT (OUTPATIENT)
Dept: CARDIOLOGY | Facility: CLINIC | Age: 89
End: 2024-07-24
Payer: MEDICARE

## 2024-07-24 ENCOUNTER — NON-APPOINTMENT (OUTPATIENT)
Age: 89
End: 2024-07-24

## 2024-07-24 VITALS
WEIGHT: 111 LBS | HEIGHT: 61 IN | OXYGEN SATURATION: 97 % | HEART RATE: 82 BPM | BODY MASS INDEX: 20.96 KG/M2 | DIASTOLIC BLOOD PRESSURE: 60 MMHG | SYSTOLIC BLOOD PRESSURE: 128 MMHG

## 2024-07-24 DIAGNOSIS — Z86.79 PERSONAL HISTORY OF OTHER DISEASES OF THE CIRCULATORY SYSTEM: ICD-10-CM

## 2024-07-24 DIAGNOSIS — I07.1 RHEUMATIC TRICUSPID INSUFFICIENCY: ICD-10-CM

## 2024-07-24 DIAGNOSIS — I49.3 VENTRICULAR PREMATURE DEPOLARIZATION: ICD-10-CM

## 2024-07-24 DIAGNOSIS — E78.5 HYPERLIPIDEMIA, UNSPECIFIED: ICD-10-CM

## 2024-07-24 DIAGNOSIS — I70.1 ATHEROSCLEROSIS OF RENAL ARTERY: ICD-10-CM

## 2024-07-24 DIAGNOSIS — I10 ESSENTIAL (PRIMARY) HYPERTENSION: ICD-10-CM

## 2024-07-24 DIAGNOSIS — G45.9 TRANSIENT CEREBRAL ISCHEMIC ATTACK, UNSPECIFIED: ICD-10-CM

## 2024-07-24 DIAGNOSIS — Z79.01 LONG TERM (CURRENT) USE OF ANTICOAGULANTS: ICD-10-CM

## 2024-07-24 DIAGNOSIS — Z87.898 PERSONAL HISTORY OF OTHER SPECIFIED CONDITIONS: ICD-10-CM

## 2024-07-24 DIAGNOSIS — E78.41 ELEVATED LIPOPROTEIN(A): ICD-10-CM

## 2024-07-24 DIAGNOSIS — Z95.5 PRESENCE OF CORONARY ANGIOPLASTY IMPLANT AND GRAFT: ICD-10-CM

## 2024-07-24 DIAGNOSIS — R73.09 OTHER ABNORMAL GLUCOSE: ICD-10-CM

## 2024-07-24 DIAGNOSIS — I77.9 DISORDER OF ARTERIES AND ARTERIOLES, UNSPECIFIED: ICD-10-CM

## 2024-07-24 DIAGNOSIS — R60.0 LOCALIZED EDEMA: ICD-10-CM

## 2024-07-24 DIAGNOSIS — I51.7 CARDIOMEGALY: ICD-10-CM

## 2024-07-24 DIAGNOSIS — Z78.9 OTHER SPECIFIED HEALTH STATUS: ICD-10-CM

## 2024-07-24 DIAGNOSIS — Z86.69 PERSONAL HISTORY OF OTHER DISEASES OF THE NERVOUS SYSTEM AND SENSE ORGANS: ICD-10-CM

## 2024-07-24 DIAGNOSIS — I27.20 PULMONARY HYPERTENSION, UNSPECIFIED: ICD-10-CM

## 2024-07-24 DIAGNOSIS — I25.10 ATHEROSCLEROTIC HEART DISEASE OF NATIVE CORONARY ARTERY W/OUT ANGINA PECTORIS: ICD-10-CM

## 2024-07-24 PROCEDURE — G2211 COMPLEX E/M VISIT ADD ON: CPT

## 2024-07-24 PROCEDURE — 99215 OFFICE O/P EST HI 40 MIN: CPT

## 2024-07-24 PROCEDURE — 36415 COLL VENOUS BLD VENIPUNCTURE: CPT

## 2024-07-24 PROCEDURE — 93000 ELECTROCARDIOGRAM COMPLETE: CPT

## 2024-07-24 RX ORDER — CLOPIDOGREL 75 MG/1
75 TABLET, FILM COATED ORAL
Refills: 0 | Status: ACTIVE | COMMUNITY

## 2024-07-24 RX ORDER — DENOSUMAB 60 MG/ML
60 INJECTION SUBCUTANEOUS
Refills: 0 | Status: ACTIVE | COMMUNITY

## 2024-07-24 NOTE — HISTORY OF PRESENT ILLNESS
[FreeTextEntry1] : Ms. Figueroa has previously been under the care of Dr. Carter.  She had CABG 2013.Recath 2015 patent KASSY to LAD, occluded grafts to OM, PDA,RCA, treated with 3 RCA stents. There was also RADHA with a renal stent, a h/o TIA, central retinal artery occlusion and labile HTN with numerous medication intolerances.  She has an elevated LPa. Since her last visit about 2 years ago she has not had another hospitalization. She recalls having some chest discomfort a number of months ago cannot really describe or remember what happened but of late has been feeling very well denying chest pain dyspnea, palpitations or syncope. She remains active.  She is in physical therapy for her back.

## 2024-07-24 NOTE — CARDIOLOGY SUMMARY
[de-identified] : 7/24/24-nsr low voltage [de-identified] : nuclear- 10/17/22- normal ef 89% [de-identified] : yohannes 12/22-nsr 1.5% apc, rare vpc longest atrial run 40 seconds  [de-identified] : 9/22- ef 65% lvh mac mild mr, pasp 40 mmhg mildly reduced RV function, aortic sclerosis [de-identified] : nuclear 2020-normal [de-identified] : 2015- stents to native mRCA, all grafts closed but kilo to lad [de-identified] : 2013- kilo-lad, svg-om, rpda,rca, d1

## 2024-07-25 LAB
ALBUMIN SERPL ELPH-MCNC: 4.9 G/DL
ALP BLD-CCNC: 55 U/L
ALT SERPL-CCNC: 11 U/L
ANION GAP SERPL CALC-SCNC: 17 MMOL/L
AST SERPL-CCNC: 28 U/L
BILIRUB SERPL-MCNC: 0.6 MG/DL
BUN SERPL-MCNC: 22 MG/DL
CALCIUM SERPL-MCNC: 9.9 MG/DL
CHLORIDE SERPL-SCNC: 100 MMOL/L
CHOLEST SERPL-MCNC: 179 MG/DL
CO2 SERPL-SCNC: 22 MMOL/L
CREAT SERPL-MCNC: 0.81 MG/DL
CRP SERPL HS-MCNC: 0.8 MG/L
EGFR: 69 ML/MIN/1.73M2
GLUCOSE SERPL-MCNC: 112 MG/DL
HDLC SERPL-MCNC: 74 MG/DL
LDLC SERPL CALC-MCNC: 87 MG/DL
NONHDLC SERPL-MCNC: 105 MG/DL
POTASSIUM SERPL-SCNC: 4.6 MMOL/L
PROT SERPL-MCNC: 8.3 G/DL
SODIUM SERPL-SCNC: 140 MMOL/L
TRIGL SERPL-MCNC: 107 MG/DL

## 2024-07-26 RX ORDER — ROSUVASTATIN CALCIUM 10 MG/1
10 TABLET, FILM COATED ORAL
Qty: 90 | Refills: 3 | Status: ACTIVE | COMMUNITY
Start: 2024-07-26

## 2024-08-07 ENCOUNTER — RX RENEWAL (OUTPATIENT)
Age: 89
End: 2024-08-07

## 2024-08-30 ENCOUNTER — APPOINTMENT (OUTPATIENT)
Dept: NEPHROLOGY | Facility: CLINIC | Age: 89
End: 2024-08-30
Payer: MEDICARE

## 2024-08-30 VITALS
BODY MASS INDEX: 21.14 KG/M2 | SYSTOLIC BLOOD PRESSURE: 126 MMHG | HEIGHT: 61 IN | DIASTOLIC BLOOD PRESSURE: 70 MMHG | WEIGHT: 112 LBS | OXYGEN SATURATION: 95 % | HEART RATE: 82 BPM

## 2024-08-30 DIAGNOSIS — I70.1 ATHEROSCLEROSIS OF RENAL ARTERY: ICD-10-CM

## 2024-08-30 DIAGNOSIS — I10 ESSENTIAL (PRIMARY) HYPERTENSION: ICD-10-CM

## 2024-08-30 DIAGNOSIS — E21.3 HYPERPARATHYROIDISM, UNSPECIFIED: ICD-10-CM

## 2024-08-30 PROCEDURE — 99214 OFFICE O/P EST MOD 30 MIN: CPT

## 2024-08-30 PROCEDURE — G2211 COMPLEX E/M VISIT ADD ON: CPT

## 2024-09-05 LAB
ANION GAP SERPL CALC-SCNC: 14 MMOL/L
BUN SERPL-MCNC: 22 MG/DL
CALCIUM SERPL-MCNC: 9.8 MG/DL
CALCIUM SERPL-MCNC: 9.8 MG/DL
CHLORIDE SERPL-SCNC: 101 MMOL/L
CO2 SERPL-SCNC: 26 MMOL/L
CREAT SERPL-MCNC: 0.74 MG/DL
EGFR: 77 ML/MIN/1.73M2
GLUCOSE SERPL-MCNC: 113 MG/DL
OSMOLALITY SERPL: 295 MOSMOL/KG
PARATHYROID HORMONE INTACT: 94 PG/ML
POTASSIUM SERPL-SCNC: 5.1 MMOL/L
SODIUM SERPL-SCNC: 141 MMOL/L
URATE SERPL-MCNC: 5.7 MG/DL

## 2024-09-05 NOTE — HISTORY OF PRESENT ILLNESS
[FreeTextEntry1] : 88 yo female initially referred by Dr. Ayala for hyponatremia and uncontrolled, resistant HTN  From previous visit(s) - SNa and BP improved on current meds - nausea improved as is back pain since having synovial cyst removed from spine  - BP remains "best in 30 years" - still c/o some le edema more so L leg ( side she had sciatica and weakness along with muscle atrophy, not to mention vein harvesting when she had CABG),exacerbated by combination of guanfacin, nifedipine/CCB however is tolerating it and using compression stockings -  - SNa 140 ( since 8/2020), had been fluctuating between 126 and 138 since 2005 (126 in 2005, 138 4/2018, 127 8/2018), - serum sodium very low during hospitalization in 10/2017 at 258, urine osm also low at that time 350 as was urine sodium 27 suggesting lack of solute intake with ongoing hypotonic fluid intake  - has longstanding hx of poorly controlled htn and is taking guanfacin (alpha 2 adrenergic), valsartan, metoprolol succ er 100mg and metoprolol 25mg   - had ECHO, interestingly found to have 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 Henriquez and Dr. Tabor (EP), had Holter,   8/2024 - BP well controlled - seen by cards, having loop recorder placed - concern that she may have occult Afib, Holter  -ve - reported retinal hemorrhages, s/p laser, R eye remains a problem - will have additional treatment ( injections) due to severe " venous occlusions" that occurred btw [id number] when she had TIAs along with dry MD - has hx of TIA , possibly multiple ~2007/2008 when BP was severely out of control vs (?) afib - carotid doppler reviewed, no sig hemodynamic stenosis   5/2024 - BP well controlled - seeing cardiology, debating on having loop recorder - concern that she may have occult Afib - reported retinal hemorrhages, but per - cards note reviewed - has hx of TIA , possibly multiple ~2007/2008 when BP was severely out of control - carotid doppler reviewed, no sig hemodynamic stenosis - no clear e/o afib   2/2024 - struggling with the passing of friends - BP well controlled - seeing cardiology - cards note reviewed  8/2023 - remains on Methenamine Hippurate, feels much better - struggling with husbands health - RVO, resolving - 3rd time, discovered incidentally - seen by Cardiology, will have monitor implanted - seen by Endo, Hgb A1c elevated, TSH elevated, PTH , Phos elevated, ionized calcium normal   5/2023 - remains on Methenamine Hippurate, feels much better - struggling with husbands health - RVO, resolving - 3rd time, discovered incidentally - seen by Endo, Hgb A1c elevated, TSH elevated, PTH , Phos elevated, ionized calcium normal     2/2023 - was seen by  Dr. Cope Uro Gyn - started on Methenamine Hippurate, feels much better -  had a fallen, lost weight caring for him,   11/2022 - was seen by  Dr. Cope Uro Gyn - started on Methenamine Hippurate, feels much better -  had a fallen, lost weight caring for him,   8/10/22 - hospitalized with sepsis 2/2 UTI - was having symptoms took hyoscamin - since discharge developed another UTI. E. faecalis - started on cipro by OB

## 2024-09-05 NOTE — HISTORY OF PRESENT ILLNESS
[FreeTextEntry1] : 89 yo female initially referred by Dr. Ayala for hyponatremia and uncontrolled, resistant HTN  From previous visit(s) - SNa and BP improved on current meds - nausea improved as is back pain since having synovial cyst removed from spine  - BP remains "best in 30 years" - still c/o some le edema more so L leg ( side she had sciatica and weakness along with muscle atrophy, not to mention vein harvesting when she had CABG),exacerbated by combination of guanfacin, nifedipine/CCB however is tolerating it and using compression stockings -  - SNa 140 ( since 8/2020), had been fluctuating between 126 and 138 since 2005 (126 in 2005, 138 4/2018, 127 8/2018), - serum sodium very low during hospitalization in 10/2017 at 258, urine osm also low at that time 350 as was urine sodium 27 suggesting lack of solute intake with ongoing hypotonic fluid intake  - has longstanding hx of poorly controlled htn and is taking guanfacin (alpha 2 adrenergic), valsartan, metoprolol succ er 100mg and metoprolol 25mg   - had ECHO, interestingly found to have 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 Henriquez and Dr. Tabor (EP), had Holter,   8/2024 - BP well controlled - seen by cards, having loop recorder placed - concern that she may have occult Afib, Holter  -ve - reported retinal hemorrhages, s/p laser, R eye remains a problem - will have additional treatment ( injections) due to severe " venous occlusions" that occurred btw [id number] when she had TIAs along with dry MD - has hx of TIA , possibly multiple ~2007/2008 when BP was severely out of control vs (?) afib - carotid doppler reviewed, no sig hemodynamic stenosis   5/2024 - BP well controlled - seeing cardiology, debating on having loop recorder - concern that she may have occult Afib - reported retinal hemorrhages, but per - cards note reviewed - has hx of TIA , possibly multiple ~2007/2008 when BP was severely out of control - carotid doppler reviewed, no sig hemodynamic stenosis - no clear e/o afib   2/2024 - struggling with the passing of friends - BP well controlled - seeing cardiology - cards note reviewed  8/2023 - remains on Methenamine Hippurate, feels much better - struggling with husbands health - RVO, resolving - 3rd time, discovered incidentally - seen by Cardiology, will have monitor implanted - seen by Endo, Hgb A1c elevated, TSH elevated, PTH , Phos elevated, ionized calcium normal   5/2023 - remains on Methenamine Hippurate, feels much better - struggling with husbands health - RVO, resolving - 3rd time, discovered incidentally - seen by Endo, Hgb A1c elevated, TSH elevated, PTH , Phos elevated, ionized calcium normal     2/2023 - was seen by  Dr. Cope Uro Gyn - started on Methenamine Hippurate, feels much better -  had a fallen, lost weight caring for him,   11/2022 - was seen by  Dr. Cope Uro Gyn - started on Methenamine Hippurate, feels much better -  had a fallen, lost weight caring for him,   8/10/22 - hospitalized with sepsis 2/2 UTI - was having symptoms took hyoscamin - since discharge developed another UTI. E. faecalis - started on cipro by OB

## 2024-09-05 NOTE — ASSESSMENT
[FreeTextEntry1] : Pleasant 88 yo female with hx of previously poorly controlled HTN and chronic hyponatremia   Hyponatremia  - both SNa and BP markedly improved  on nifedipine and metoprolol, guanfacin. Her sodium is also improved (120's)   Back pain - Her back pain is improved  - off pain meds besides tylenol n( no ASA 2/2 guanfacin - edema sec to CCB, vein harvesting, debilitation/immobility, stasis - improved with exercise and weight loss - no longer using support stockings;   HTN -bp wnl tolerating reduced dose of metoprolol of 50mg ( was tapered from 125mg to 100mg and bp stable at this time) - continue - hx of RADHA w/ stenting  Valvular Heart Disease CAD - found to have mod TR, mild PA HTN, mild MR, suspect prim TR ( vs sec to pulm HTN) -  repeat ECHO with Cardiology showed only MILD TR - cardiology note reviewed -  - resumed  plavix, but stopped due to conern of retinal bleeding, but per pt per Ophthalmology rec continue plavix, cardiology rec loop to r/o afib, to guide AC  Hx of TIAs - rec she see [facility] neurologist - cont l plavix and check plavix reactivity assay or aspirin reactivity assay  hPTH - checked D2 and D3 levels - recheck PTH check Mag - will accelerate bone loss (pt was on prolia now on fosamax - c/o reflux despite PPI - stopped fosamax)  Retinal hemorrhaging - sees ophthalmologist for retinal bleeding, MD

## 2024-09-05 NOTE — ASSESSMENT
[FreeTextEntry1] : Pleasant 88 yo female with hx of previously poorly controlled HTN and chronic hyponatremia   Hyponatremia  - both SNa and BP markedly improved  on nifedipine and metoprolol, guanfacin. Her sodium is also improved (120's)   Back pain - Her back pain is improved  - off pain meds besides tylenol n( no ASA 2/2 guanfacin - edema sec to CCB, vein harvesting, debilitation/immobility, stasis - improved with exercise and weight loss - no longer using support stockings;   HTN -bp wnl tolerating reduced dose of metoprolol of 50mg ( was tapered from 125mg to 100mg and bp stable at this time) - continue - hx of RADHA w/ stenting  Valvular Heart Disease CAD - found to have mod TR, mild PA HTN, mild MR, suspect prim TR ( vs sec to pulm HTN) -  repeat ECHO with Cardiology showed only MILD TR - cardiology note reviewed -  - resumed  plavix, but stopped due to conern of retinal bleeding, but per pt per Ophthalmology rec continue plavix, cardiology rec loop to r/o afib, to guide AC  Hx of TIAs - rec she see [facility] neurologist - cont l plavix and check plavix reactivity assay or aspirin reactivity assay  hPTH - checked D2 and D3 levels - recheck PTH check Mag - will accelerate bone loss (pt was on prolia now on fosamax - c/o reflux despite PPI - stopped fosamax)  Retinal hemorrhaging - sees ophthalmologist for retinal bleeding, MD

## 2024-09-06 ENCOUNTER — APPOINTMENT (OUTPATIENT)
Dept: CARDIOLOGY | Facility: CLINIC | Age: 89
End: 2024-09-06
Payer: MEDICARE

## 2024-09-06 PROCEDURE — 36415 COLL VENOUS BLD VENIPUNCTURE: CPT

## 2024-09-06 PROCEDURE — 93306 TTE W/DOPPLER COMPLETE: CPT

## 2024-09-11 ENCOUNTER — APPOINTMENT (OUTPATIENT)
Dept: NEPHROLOGY | Facility: CLINIC | Age: 89
End: 2024-09-11
Payer: MEDICARE

## 2024-09-11 ENCOUNTER — RESULT REVIEW (OUTPATIENT)
Age: 89
End: 2024-09-11

## 2024-09-11 DIAGNOSIS — E21.3 HYPERPARATHYROIDISM, UNSPECIFIED: ICD-10-CM

## 2024-09-11 DIAGNOSIS — H35.60 RETINAL HEMORRHAGE, UNSPECIFIED EYE: ICD-10-CM

## 2024-09-11 DIAGNOSIS — I10 ESSENTIAL (PRIMARY) HYPERTENSION: ICD-10-CM

## 2024-09-11 DIAGNOSIS — D64.9 ANEMIA, UNSPECIFIED: ICD-10-CM

## 2024-09-11 DIAGNOSIS — E55.9 VITAMIN D DEFICIENCY, UNSPECIFIED: ICD-10-CM

## 2024-09-11 DIAGNOSIS — I70.1 ATHEROSCLEROSIS OF RENAL ARTERY: ICD-10-CM

## 2024-09-11 PROCEDURE — 36415 COLL VENOUS BLD VENIPUNCTURE: CPT

## 2024-09-22 ENCOUNTER — NON-APPOINTMENT (OUTPATIENT)
Age: 89
End: 2024-09-22

## 2024-09-23 ENCOUNTER — APPOINTMENT (OUTPATIENT)
Dept: OBGYN | Facility: CLINIC | Age: 89
End: 2024-09-23
Payer: MEDICARE

## 2024-09-23 DIAGNOSIS — N81.6 RECTOCELE: ICD-10-CM

## 2024-09-23 DIAGNOSIS — N81.4 UTEROVAGINAL PROLAPSE, UNSPECIFIED: ICD-10-CM

## 2024-09-23 PROCEDURE — 57160 INSERT PESSARY/OTHER DEVICE: CPT

## 2024-10-09 ENCOUNTER — APPOINTMENT (OUTPATIENT)
Dept: ENDOCRINOLOGY | Facility: CLINIC | Age: 89
End: 2024-10-09
Payer: MEDICARE

## 2024-10-09 VITALS
BODY MASS INDEX: 21.25 KG/M2 | HEIGHT: 61 IN | OXYGEN SATURATION: 96 % | SYSTOLIC BLOOD PRESSURE: 114 MMHG | DIASTOLIC BLOOD PRESSURE: 76 MMHG | WEIGHT: 112.56 LBS | HEART RATE: 78 BPM

## 2024-10-09 DIAGNOSIS — E03.9 HYPOTHYROIDISM, UNSPECIFIED: ICD-10-CM

## 2024-10-09 DIAGNOSIS — M81.0 AGE-RELATED OSTEOPOROSIS W/OUT CURRENT PATHOLOGICAL FRACTURE: ICD-10-CM

## 2024-10-09 PROCEDURE — 99215 OFFICE O/P EST HI 40 MIN: CPT

## 2024-10-21 ENCOUNTER — RX RENEWAL (OUTPATIENT)
Age: 89
End: 2024-10-21

## 2024-10-28 ENCOUNTER — RESULT REVIEW (OUTPATIENT)
Age: 89
End: 2024-10-28

## 2024-10-28 ENCOUNTER — TRANSCRIPTION ENCOUNTER (OUTPATIENT)
Age: 89
End: 2024-10-28

## 2024-10-31 ENCOUNTER — RX RENEWAL (OUTPATIENT)
Age: 89
End: 2024-10-31

## 2024-11-20 ENCOUNTER — APPOINTMENT (OUTPATIENT)
Dept: NEPHROLOGY | Facility: CLINIC | Age: 89
End: 2024-11-20
Payer: MEDICARE

## 2024-11-20 VITALS
HEART RATE: 100 BPM | DIASTOLIC BLOOD PRESSURE: 60 MMHG | WEIGHT: 111 LBS | OXYGEN SATURATION: 96 % | HEIGHT: 61 IN | SYSTOLIC BLOOD PRESSURE: 120 MMHG | BODY MASS INDEX: 20.96 KG/M2

## 2024-11-20 DIAGNOSIS — I70.1 ATHEROSCLEROSIS OF RENAL ARTERY: ICD-10-CM

## 2024-11-20 DIAGNOSIS — I10 ESSENTIAL (PRIMARY) HYPERTENSION: ICD-10-CM

## 2024-11-20 DIAGNOSIS — E87.1 HYPO-OSMOLALITY AND HYPONATREMIA: ICD-10-CM

## 2024-11-20 DIAGNOSIS — E21.3 HYPERPARATHYROIDISM, UNSPECIFIED: ICD-10-CM

## 2024-11-20 PROCEDURE — 99214 OFFICE O/P EST MOD 30 MIN: CPT

## 2024-11-20 PROCEDURE — G2211 COMPLEX E/M VISIT ADD ON: CPT

## 2025-01-08 ENCOUNTER — RX RENEWAL (OUTPATIENT)
Age: 89
End: 2025-01-08

## 2025-02-18 ENCOUNTER — APPOINTMENT (OUTPATIENT)
Dept: OBGYN | Facility: CLINIC | Age: 89
End: 2025-02-18
Payer: MEDICARE

## 2025-02-18 ENCOUNTER — NON-APPOINTMENT (OUTPATIENT)
Age: 89
End: 2025-02-18

## 2025-02-18 VITALS
DIASTOLIC BLOOD PRESSURE: 70 MMHG | BODY MASS INDEX: 20.96 KG/M2 | SYSTOLIC BLOOD PRESSURE: 120 MMHG | WEIGHT: 111 LBS | HEIGHT: 61 IN

## 2025-02-18 DIAGNOSIS — Z12.31 ENCOUNTER FOR SCREENING MAMMOGRAM FOR MALIGNANT NEOPLASM OF BREAST: ICD-10-CM

## 2025-02-18 DIAGNOSIS — Z01.419 ENCOUNTER FOR GYNECOLOGICAL EXAMINATION (GENERAL) (ROUTINE) W/OUT ABNORMAL FINDINGS: ICD-10-CM

## 2025-02-18 DIAGNOSIS — N81.4 UTEROVAGINAL PROLAPSE, UNSPECIFIED: ICD-10-CM

## 2025-02-18 DIAGNOSIS — N81.6 RECTOCELE: ICD-10-CM

## 2025-02-18 DIAGNOSIS — M81.0 AGE-RELATED OSTEOPOROSIS W/OUT CURRENT PATHOLOGICAL FRACTURE: ICD-10-CM

## 2025-02-18 PROCEDURE — G0101: CPT

## 2025-02-18 PROCEDURE — 57160 INSERT PESSARY/OTHER DEVICE: CPT

## 2025-03-11 ENCOUNTER — APPOINTMENT (OUTPATIENT)
Dept: ENDOCRINOLOGY | Facility: CLINIC | Age: 89
End: 2025-03-11
Payer: MEDICARE

## 2025-03-11 VITALS
WEIGHT: 112 LBS | SYSTOLIC BLOOD PRESSURE: 114 MMHG | HEART RATE: 78 BPM | OXYGEN SATURATION: 98 % | BODY MASS INDEX: 21.14 KG/M2 | DIASTOLIC BLOOD PRESSURE: 80 MMHG | HEIGHT: 61 IN

## 2025-03-11 DIAGNOSIS — M81.8 OTHER OSTEOPOROSIS W/OUT CURRENT PATHOLOGICAL FRACTURE: ICD-10-CM

## 2025-03-11 DIAGNOSIS — E27.9 DISORDER OF ADRENAL GLAND, UNSPECIFIED: ICD-10-CM

## 2025-03-11 LAB
25(OH)D3 SERPL-MCNC: 30.4 NG/ML
ANION GAP SERPL CALC-SCNC: 10 MMOL/L
BUN SERPL-MCNC: 21 MG/DL
CALCIUM SERPL-MCNC: 9.2 MG/DL
CHLORIDE SERPL-SCNC: 100 MMOL/L
CO2 SERPL-SCNC: 27 MMOL/L
CREAT SERPL-MCNC: 0.73 MG/DL
EGFRCR SERPLBLD CKD-EPI 2021: 79 ML/MIN/1.73M2
GLUCOSE SERPL-MCNC: 95 MG/DL
POTASSIUM SERPL-SCNC: 4.8 MMOL/L
SODIUM SERPL-SCNC: 138 MMOL/L
TSH SERPL-ACNC: 2.91 UIU/ML

## 2025-03-11 PROCEDURE — 99215 OFFICE O/P EST HI 40 MIN: CPT

## 2025-03-11 PROCEDURE — G2211 COMPLEX E/M VISIT ADD ON: CPT

## 2025-03-17 ENCOUNTER — RESULT REVIEW (OUTPATIENT)
Age: 89
End: 2025-03-17

## 2025-03-21 ENCOUNTER — APPOINTMENT (OUTPATIENT)
Dept: NEPHROLOGY | Facility: CLINIC | Age: 89
End: 2025-03-21
Payer: MEDICARE

## 2025-03-21 ENCOUNTER — RESULT REVIEW (OUTPATIENT)
Age: 89
End: 2025-03-21

## 2025-03-21 VITALS
SYSTOLIC BLOOD PRESSURE: 130 MMHG | HEART RATE: 81 BPM | BODY MASS INDEX: 20.96 KG/M2 | HEIGHT: 61 IN | DIASTOLIC BLOOD PRESSURE: 80 MMHG | OXYGEN SATURATION: 98 % | WEIGHT: 111 LBS

## 2025-03-21 DIAGNOSIS — E78.5 HYPERLIPIDEMIA, UNSPECIFIED: ICD-10-CM

## 2025-03-21 DIAGNOSIS — E87.1 HYPO-OSMOLALITY AND HYPONATREMIA: ICD-10-CM

## 2025-03-21 DIAGNOSIS — I10 ESSENTIAL (PRIMARY) HYPERTENSION: ICD-10-CM

## 2025-03-21 DIAGNOSIS — E21.3 HYPERPARATHYROIDISM, UNSPECIFIED: ICD-10-CM

## 2025-03-21 PROCEDURE — G2211 COMPLEX E/M VISIT ADD ON: CPT

## 2025-03-21 PROCEDURE — 99214 OFFICE O/P EST MOD 30 MIN: CPT

## 2025-04-11 ENCOUNTER — APPOINTMENT (OUTPATIENT)
Dept: CARDIOLOGY | Facility: CLINIC | Age: 89
End: 2025-04-11
Payer: MEDICARE

## 2025-04-11 PROCEDURE — 36415 COLL VENOUS BLD VENIPUNCTURE: CPT

## 2025-04-12 LAB
ALBUMIN SERPL ELPH-MCNC: 4.1 G/DL
ALP BLD-CCNC: 55 U/L
ALT SERPL-CCNC: 11 U/L
AST SERPL-CCNC: 28 U/L
BILIRUB DIRECT SERPL-MCNC: 0.1 MG/DL
BILIRUB INDIRECT SERPL-MCNC: 0.4 MG/DL
BILIRUB SERPL-MCNC: 0.5 MG/DL
CHOLEST SERPL-MCNC: 164 MG/DL
HDLC SERPL-MCNC: 69 MG/DL
LDLC SERPL-MCNC: 80 MG/DL
NONHDLC SERPL-MCNC: 95 MG/DL
PROT SERPL-MCNC: 6.8 G/DL
TRIGL SERPL-MCNC: 82 MG/DL

## 2025-04-14 RX ORDER — ROSUVASTATIN CALCIUM 20 MG/1
20 TABLET, FILM COATED ORAL DAILY
Qty: 90 | Refills: 3 | Status: ACTIVE | COMMUNITY
Start: 2025-04-14 | End: 1900-01-01

## 2025-04-22 ENCOUNTER — RX RENEWAL (OUTPATIENT)
Age: 89
End: 2025-04-22

## 2025-05-21 ENCOUNTER — LABORATORY RESULT (OUTPATIENT)
Age: 89
End: 2025-05-21

## 2025-05-21 ENCOUNTER — APPOINTMENT (OUTPATIENT)
Dept: CARDIOLOGY | Facility: CLINIC | Age: 89
End: 2025-05-21
Payer: MEDICARE

## 2025-05-21 PROCEDURE — 36415 COLL VENOUS BLD VENIPUNCTURE: CPT

## 2025-05-22 ENCOUNTER — TRANSCRIPTION ENCOUNTER (OUTPATIENT)
Age: 89
End: 2025-05-22

## 2025-06-19 ENCOUNTER — APPOINTMENT (OUTPATIENT)
Dept: NEPHROLOGY | Facility: CLINIC | Age: 89
End: 2025-06-19
Payer: MEDICARE

## 2025-06-19 VITALS
HEART RATE: 68 BPM | OXYGEN SATURATION: 98 % | DIASTOLIC BLOOD PRESSURE: 60 MMHG | BODY MASS INDEX: 20.77 KG/M2 | SYSTOLIC BLOOD PRESSURE: 110 MMHG | WEIGHT: 110 LBS | HEIGHT: 61 IN

## 2025-06-19 PROCEDURE — 99214 OFFICE O/P EST MOD 30 MIN: CPT

## 2025-06-19 PROCEDURE — G2211 COMPLEX E/M VISIT ADD ON: CPT

## 2025-07-18 ENCOUNTER — RX RENEWAL (OUTPATIENT)
Age: 89
End: 2025-07-18

## 2025-07-24 ENCOUNTER — NON-APPOINTMENT (OUTPATIENT)
Age: 89
End: 2025-07-24

## 2025-07-24 DIAGNOSIS — Z87.39 PERSONAL HISTORY OF OTHER DISEASES OF THE MUSCULOSKELETAL SYSTEM AND CONNECTIVE TISSUE: ICD-10-CM

## 2025-07-24 DIAGNOSIS — M70.61 TROCHANTERIC BURSITIS, RIGHT HIP: ICD-10-CM

## 2025-07-24 DIAGNOSIS — Z01.419 ENCOUNTER FOR GYNECOLOGICAL EXAMINATION (GENERAL) (ROUTINE) W/OUT ABNORMAL FINDINGS: ICD-10-CM

## 2025-07-24 DIAGNOSIS — Z12.31 ENCOUNTER FOR SCREENING MAMMOGRAM FOR MALIGNANT NEOPLASM OF BREAST: ICD-10-CM

## 2025-07-24 DIAGNOSIS — M81.8 OTHER OSTEOPOROSIS W/OUT CURRENT PATHOLOGICAL FRACTURE: ICD-10-CM

## 2025-07-25 ENCOUNTER — APPOINTMENT (OUTPATIENT)
Dept: CARDIOLOGY | Facility: CLINIC | Age: 89
End: 2025-07-25
Payer: MEDICARE

## 2025-07-25 VITALS
WEIGHT: 110 LBS | OXYGEN SATURATION: 96 % | SYSTOLIC BLOOD PRESSURE: 138 MMHG | HEIGHT: 61 IN | BODY MASS INDEX: 20.77 KG/M2 | HEART RATE: 87 BPM | DIASTOLIC BLOOD PRESSURE: 68 MMHG

## 2025-07-25 DIAGNOSIS — I07.1 RHEUMATIC TRICUSPID INSUFFICIENCY: ICD-10-CM

## 2025-07-25 DIAGNOSIS — I25.10 ATHEROSCLEROTIC HEART DISEASE OF NATIVE CORONARY ARTERY W/OUT ANGINA PECTORIS: ICD-10-CM

## 2025-07-25 DIAGNOSIS — Z86.79 PERSONAL HISTORY OF OTHER DISEASES OF THE CIRCULATORY SYSTEM: ICD-10-CM

## 2025-07-25 DIAGNOSIS — I51.7 CARDIOMEGALY: ICD-10-CM

## 2025-07-25 DIAGNOSIS — E78.5 HYPERLIPIDEMIA, UNSPECIFIED: ICD-10-CM

## 2025-07-25 DIAGNOSIS — I70.1 ATHEROSCLEROSIS OF RENAL ARTERY: ICD-10-CM

## 2025-07-25 DIAGNOSIS — E78.41 ELEVATED LIPOPROTEIN(A): ICD-10-CM

## 2025-07-25 DIAGNOSIS — I10 ESSENTIAL (PRIMARY) HYPERTENSION: ICD-10-CM

## 2025-07-25 DIAGNOSIS — G45.9 TRANSIENT CEREBRAL ISCHEMIC ATTACK, UNSPECIFIED: ICD-10-CM

## 2025-07-25 DIAGNOSIS — R73.09 OTHER ABNORMAL GLUCOSE: ICD-10-CM

## 2025-07-25 DIAGNOSIS — E27.9 DISORDER OF ADRENAL GLAND, UNSPECIFIED: ICD-10-CM

## 2025-07-25 PROCEDURE — 93000 ELECTROCARDIOGRAM COMPLETE: CPT

## 2025-07-25 PROCEDURE — 99215 OFFICE O/P EST HI 40 MIN: CPT

## 2025-08-05 ENCOUNTER — APPOINTMENT (OUTPATIENT)
Dept: OBGYN | Facility: CLINIC | Age: 89
End: 2025-08-05

## 2025-08-05 VITALS
DIASTOLIC BLOOD PRESSURE: 60 MMHG | HEIGHT: 61 IN | WEIGHT: 109 LBS | BODY MASS INDEX: 20.58 KG/M2 | SYSTOLIC BLOOD PRESSURE: 126 MMHG

## 2025-08-05 DIAGNOSIS — N81.6 RECTOCELE: ICD-10-CM

## 2025-08-05 PROCEDURE — 57160 INSERT PESSARY/OTHER DEVICE: CPT

## 2025-08-11 ENCOUNTER — APPOINTMENT (OUTPATIENT)
Dept: OBGYN | Facility: CLINIC | Age: 89
End: 2025-08-11
Payer: MEDICARE

## 2025-08-11 DIAGNOSIS — N81.4 UTEROVAGINAL PROLAPSE, UNSPECIFIED: ICD-10-CM

## 2025-08-11 PROCEDURE — 57160 INSERT PESSARY/OTHER DEVICE: CPT

## 2025-09-02 ENCOUNTER — APPOINTMENT (OUTPATIENT)
Dept: OBGYN | Facility: CLINIC | Age: 89
End: 2025-09-02
Payer: MEDICARE

## 2025-09-02 VITALS
SYSTOLIC BLOOD PRESSURE: 140 MMHG | BODY MASS INDEX: 20.58 KG/M2 | DIASTOLIC BLOOD PRESSURE: 62 MMHG | WEIGHT: 109 LBS | HEIGHT: 61 IN

## 2025-09-02 DIAGNOSIS — N81.4 UTEROVAGINAL PROLAPSE, UNSPECIFIED: ICD-10-CM

## 2025-09-02 DIAGNOSIS — N81.6 RECTOCELE: ICD-10-CM

## 2025-09-02 PROCEDURE — 57160 INSERT PESSARY/OTHER DEVICE: CPT

## 2025-09-10 ENCOUNTER — APPOINTMENT (OUTPATIENT)
Dept: ENDOCRINOLOGY | Facility: CLINIC | Age: 89
End: 2025-09-10
Payer: MEDICARE

## 2025-09-10 VITALS
HEART RATE: 88 BPM | HEIGHT: 61 IN | BODY MASS INDEX: 20.96 KG/M2 | SYSTOLIC BLOOD PRESSURE: 122 MMHG | DIASTOLIC BLOOD PRESSURE: 64 MMHG | OXYGEN SATURATION: 96 % | WEIGHT: 111 LBS

## 2025-09-10 DIAGNOSIS — M81.8 OTHER OSTEOPOROSIS W/OUT CURRENT PATHOLOGICAL FRACTURE: ICD-10-CM

## 2025-09-10 DIAGNOSIS — C44.621 SQUAMOUS CELL CARCINOMA OF SKIN OF UNSPECIFIED UPPER LIMB, INCLUDING SHOULDER: ICD-10-CM

## 2025-09-10 DIAGNOSIS — E03.9 HYPOTHYROIDISM, UNSPECIFIED: ICD-10-CM

## 2025-09-10 LAB
25(OH)D3 SERPL-MCNC: 36.1 NG/ML
ANION GAP SERPL CALC-SCNC: 14 MMOL/L
BUN SERPL-MCNC: 22 MG/DL
CALCIUM SERPL-MCNC: 9 MG/DL
CALCIUM SERPL-MCNC: 9 MG/DL
CHLORIDE SERPL-SCNC: 100 MMOL/L
CO2 SERPL-SCNC: 24 MMOL/L
CREAT SERPL-MCNC: 0.73 MG/DL
EGFRCR SERPLBLD CKD-EPI 2021: 78 ML/MIN/1.73M2
GLUCOSE SERPL-MCNC: 109 MG/DL
PARATHYROID HORMONE INTACT: 92 PG/ML
PHOSPHATE SERPL-MCNC: 4.5 MG/DL
POTASSIUM SERPL-SCNC: 4.4 MMOL/L
SODIUM SERPL-SCNC: 138 MMOL/L

## 2025-09-10 PROCEDURE — 36415 COLL VENOUS BLD VENIPUNCTURE: CPT

## 2025-09-10 PROCEDURE — 99215 OFFICE O/P EST HI 40 MIN: CPT

## 2025-09-10 PROCEDURE — G2211 COMPLEX E/M VISIT ADD ON: CPT

## 2025-09-12 LAB — CA-I SERPL-SCNC: 4.9 MG/DL

## 2025-09-18 ENCOUNTER — APPOINTMENT (OUTPATIENT)
Dept: NEPHROLOGY | Facility: CLINIC | Age: 89
End: 2025-09-18
Payer: MEDICARE

## 2025-09-18 VITALS
OXYGEN SATURATION: 97 % | DIASTOLIC BLOOD PRESSURE: 70 MMHG | HEIGHT: 61 IN | SYSTOLIC BLOOD PRESSURE: 110 MMHG | HEART RATE: 77 BPM

## 2025-09-18 DIAGNOSIS — I10 ESSENTIAL (PRIMARY) HYPERTENSION: ICD-10-CM

## 2025-09-18 DIAGNOSIS — E21.3 HYPERPARATHYROIDISM, UNSPECIFIED: ICD-10-CM

## 2025-09-18 DIAGNOSIS — E87.1 HYPO-OSMOLALITY AND HYPONATREMIA: ICD-10-CM

## 2025-09-18 DIAGNOSIS — D64.9 ANEMIA, UNSPECIFIED: ICD-10-CM

## 2025-09-18 DIAGNOSIS — E78.5 HYPERLIPIDEMIA, UNSPECIFIED: ICD-10-CM

## 2025-09-18 PROCEDURE — 99214 OFFICE O/P EST MOD 30 MIN: CPT
